# Patient Record
Sex: MALE | Race: WHITE | NOT HISPANIC OR LATINO | Employment: FULL TIME | URBAN - METROPOLITAN AREA
[De-identification: names, ages, dates, MRNs, and addresses within clinical notes are randomized per-mention and may not be internally consistent; named-entity substitution may affect disease eponyms.]

---

## 2024-01-24 ENCOUNTER — HOSPITAL ENCOUNTER (EMERGENCY)
Facility: HOSPITAL | Age: 64
Discharge: HOME | End: 2024-01-24
Attending: EMERGENCY MEDICINE
Payer: COMMERCIAL

## 2024-01-24 ENCOUNTER — APPOINTMENT (OUTPATIENT)
Dept: RADIOLOGY | Facility: HOSPITAL | Age: 64
End: 2024-01-24
Payer: COMMERCIAL

## 2024-01-24 VITALS
DIASTOLIC BLOOD PRESSURE: 86 MMHG | TEMPERATURE: 96.8 F | WEIGHT: 185 LBS | HEIGHT: 70 IN | SYSTOLIC BLOOD PRESSURE: 123 MMHG | HEART RATE: 62 BPM | BODY MASS INDEX: 26.48 KG/M2 | RESPIRATION RATE: 18 BRPM | OXYGEN SATURATION: 94 %
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
## 2024-01-24 DIAGNOSIS — S61.412A LACERATION OF LEFT HAND, FOREIGN BODY PRESENCE UNSPECIFIED, INITIAL ENCOUNTER: Primary | ICD-10-CM

## 2024-01-24 DIAGNOSIS — S52.502A CLOSED FRACTURE OF DISTAL END OF LEFT RADIUS, UNSPECIFIED FRACTURE MORPHOLOGY, INITIAL ENCOUNTER: ICD-10-CM

## 2024-01-24 PROCEDURE — 99284 EMERGENCY DEPT VISIT MOD MDM: CPT | Performed by: EMERGENCY MEDICINE

## 2024-01-24 PROCEDURE — 73110 X-RAY EXAM OF WRIST: CPT | Mod: LEFT SIDE | Performed by: RADIOLOGY

## 2024-01-24 PROCEDURE — 99291 CRITICAL CARE FIRST HOUR: CPT | Performed by: EMERGENCY MEDICINE

## 2024-01-24 PROCEDURE — 73590 X-RAY EXAM OF LOWER LEG: CPT | Mod: RIGHT SIDE | Performed by: RADIOLOGY

## 2024-01-24 PROCEDURE — 99284 EMERGENCY DEPT VISIT MOD MDM: CPT | Mod: 25

## 2024-01-24 PROCEDURE — 73130 X-RAY EXAM OF HAND: CPT | Mod: LEFT SIDE | Performed by: RADIOLOGY

## 2024-01-24 PROCEDURE — 12002 RPR S/N/AX/GEN/TRNK2.6-7.5CM: CPT | Mod: XS

## 2024-01-24 PROCEDURE — 73590 X-RAY EXAM OF LOWER LEG: CPT | Mod: RT

## 2024-01-24 PROCEDURE — 29125 APPL SHORT ARM SPLINT STATIC: CPT

## 2024-01-24 PROCEDURE — 73110 X-RAY EXAM OF WRIST: CPT | Mod: LT

## 2024-01-24 PROCEDURE — G0390 TRAUMA RESPONS W/HOSP CRITI: HCPCS

## 2024-01-24 PROCEDURE — 73130 X-RAY EXAM OF HAND: CPT | Mod: LT

## 2024-01-24 PROCEDURE — 12002 RPR S/N/AX/GEN/TRNK2.6-7.5CM: CPT
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
## 2024-01-25 ENCOUNTER — OFFICE VISIT (OUTPATIENT)
Dept: ORTHOPEDIC SURGERY | Facility: CLINIC | Age: 64
End: 2024-01-25
Payer: COMMERCIAL

## 2024-01-25 DIAGNOSIS — S52.502A TRAUMATIC CLOSED FX OF DISTAL END OF RADIUS WITH MINIMAL DISPLACEMENT, LEFT, INITIAL ENCOUNTER: Primary | ICD-10-CM

## 2024-01-25 PROCEDURE — 99203 OFFICE O/P NEW LOW 30 MIN: CPT | Performed by: ORTHOPAEDIC SURGERY

## 2024-01-25 PROCEDURE — 1036F TOBACCO NON-USER: CPT | Performed by: ORTHOPAEDIC SURGERY

## 2024-01-25 RX ORDER — PROPRANOLOL HYDROCHLORIDE 120 MG/1
120 CAPSULE, EXTENDED RELEASE ORAL
COMMUNITY
Start: 2023-04-04
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
## 2024-01-25 NOTE — LETTER
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 He is also unable to drive .This will be until further notice.  .    If you have any questions or concerns, please don't hesitate to call.         Sincerely,        Michael A LoPresti, MD    CC: No Recipients

## 2024-01-31 ENCOUNTER — APPOINTMENT (OUTPATIENT)
Dept: ORTHOPEDIC SURGERY | Facility: CLINIC | Age: 64
End: 2024-01-31

## 2024-02-01 DIAGNOSIS — M25.532 WRIST PAIN, ACUTE, LEFT: ICD-10-CM

## 2024-02-02 PROCEDURE — L3984 UPPER EXT FX ORTHOSIS WRIST: HCPCS | Performed by: ORTHOPAEDIC SURGERY

## 2024-02-02 PROCEDURE — L3995 SOCK FRACTURE OR EQUAL EACH: HCPCS | Performed by: ORTHOPAEDIC SURGERY

## 2024-02-02 NOTE — ED PROCEDURE NOTE
Procedure  Critical Care    Performed by: Kendra Mcclellan DO  Authorized by: Kendra Mcclellan DO    Critical care provider statement:     Critical care time (minutes):  35    Critical care time was exclusive of:  Separately billable procedures and treating other patients    Critical care was necessary to treat or prevent imminent or life-threatening deterioration of the following conditions:  Trauma    Critical care was time spent personally by me on the following activities:  Development of treatment plan with patient or surrogate, discussions with consultants, discussions with primary provider, evaluation of patient's response to treatment, review of old charts, re-evaluation of patient's condition, pulse oximetry, ordering and review of radiographic studies, ordering and review of laboratory studies, ordering and performing treatments and interventions, blood draw for specimens, examination of patient and obtaining history from patient or surrogate    Care discussed with comment:  Trauma surgeon               Kendra Mcclellan DO  02/02/24 0645

## 2024-02-05 ENCOUNTER — HOSPITAL ENCOUNTER (OUTPATIENT)
Dept: RADIOLOGY | Facility: CLINIC | Age: 64
Discharge: HOME | End: 2024-02-05
Payer: COMMERCIAL

## 2024-02-05 ENCOUNTER — OFFICE VISIT (OUTPATIENT)
Dept: ORTHOPEDIC SURGERY | Facility: CLINIC | Age: 64
End: 2024-02-05
Payer: COMMERCIAL

## 2024-02-05 VITALS — BODY MASS INDEX: 26.48 KG/M2 | WEIGHT: 185 LBS | HEIGHT: 70 IN

## 2024-02-05 DIAGNOSIS — M25.532 WRIST PAIN, ACUTE, LEFT: ICD-10-CM

## 2024-02-05 DIAGNOSIS — S52.502A TRAUMATIC CLOSED FX OF DISTAL END OF RADIUS WITH MINIMAL DISPLACEMENT, LEFT, INITIAL ENCOUNTER: ICD-10-CM

## 2024-02-05 DIAGNOSIS — S63.512A SPRAIN OF CARPAL JOINT OF LEFT WRIST, INITIAL ENCOUNTER: Primary | ICD-10-CM

## 2024-02-05 PROCEDURE — 99214 OFFICE O/P EST MOD 30 MIN: CPT | Performed by: ORTHOPAEDIC SURGERY

## 2024-02-05 PROCEDURE — 73110 X-RAY EXAM OF WRIST: CPT | Mod: LT

## 2024-02-05 PROCEDURE — 73110 X-RAY EXAM OF WRIST: CPT | Mod: LEFT SIDE | Performed by: RADIOLOGY

## 2024-02-05 NOTE — PROGRESS NOTES
Subjective    Patient ID: Michael Padron is a 63 y.o. male.    Chief Complaint: Worker's Compensation (DX: S52.502A/DOI: 1/24/24//F/U LT WRIST/)       This is a 63-year-old male seen in follow-up with regard to his left wrist injury which occurred at work 10 to 12 days ago.  Subsequent x-rays reviewed previously demonstrated a left distal radius fracture with minimal displacement.  The patient was placed in an Exos brace which he has been wearing regularly over the past 10 days.  Complains of mild to moderate discomfort but feels the swelling has diminished.  Denies any significant previous history of left wrist injury.    X-rays performed and reviewed at length by myself demonstrate the left distal radius fracture remains in satisfactory position and alignment.  On the AP view of the wrist though there is potential evidence of widening of the scapholunate space consistent with a ligamentous disruption in addition to the fracture    This patient's past medical, social, and family history were reviewed as well as a review of systems including updates on the patient's information encounter sheet    Physical Examination  Constitutional: Patient's height and weight reviewed, appears well kempt  Psychiatric: Alert and oriented ×3, appropriate mood and behavior  Pulmonary: Breathing appears nonlabored, no apparent distress  Lymphatic: No appreciable lymphadenopathy to both the upper and lower extremities  Skin: No open lesions, rashes, ulcerations  Neurologic: Gross motor and sensory exam appear intact (except for abnormalities noted in the below muscle skeletal exam)    Musculoskeletal: The brace was removed today and the left wrist reveals mild swelling.  Tenderness both over the distal radius and at the level of the carpus.  No gross deformity.  Neurologic intact to the fingertips.    Assessment: Left distal radius fracture with potential scapholunate disassociation    Plan: A long discussion ensued with the  patient regarding the fracture as well as the potential ligamentous injury to his left wrist.  We discussed the natural history of scapholunate dissociation with regard to arthritis over many years.  The patient is taken this in consideration and would like to go forward with the MRI scan to evaluate for potential ligamentous disruption.  We then further discussed the options including potential surgery if that is so desired by the patient.          Current Outpatient Medications:     propranolol LA (Inderal LA) 120 mg 24 hr capsule, Take 1 capsule (120 mg) by mouth once daily., Disp: , Rfl:

## 2024-02-20 ENCOUNTER — HOSPITAL ENCOUNTER (OUTPATIENT)
Dept: RADIOLOGY | Facility: CLINIC | Age: 64
Discharge: HOME | End: 2024-02-20
Payer: COMMERCIAL

## 2024-02-20 DIAGNOSIS — S52.502A TRAUMATIC CLOSED FX OF DISTAL END OF RADIUS WITH MINIMAL DISPLACEMENT, LEFT, INITIAL ENCOUNTER: ICD-10-CM

## 2024-02-20 PROCEDURE — 73221 MRI JOINT UPR EXTREM W/O DYE: CPT | Mod: LEFT SIDE | Performed by: RADIOLOGY

## 2024-02-20 PROCEDURE — 73221 MRI JOINT UPR EXTREM W/O DYE: CPT | Mod: LT

## 2024-02-29 ENCOUNTER — OFFICE VISIT (OUTPATIENT)
Dept: ORTHOPEDIC SURGERY | Facility: CLINIC | Age: 64
End: 2024-02-29
Payer: COMMERCIAL

## 2024-02-29 DIAGNOSIS — S52.502A TRAUMATIC CLOSED FX OF DISTAL END OF RADIUS WITH MINIMAL DISPLACEMENT, LEFT, INITIAL ENCOUNTER: Primary | ICD-10-CM

## 2024-02-29 DIAGNOSIS — S63.512A SPRAIN OF CARPAL JOINT OF LEFT WRIST, INITIAL ENCOUNTER: ICD-10-CM

## 2024-02-29 PROCEDURE — 99213 OFFICE O/P EST LOW 20 MIN: CPT | Performed by: ORTHOPAEDIC SURGERY

## 2024-02-29 NOTE — PROGRESS NOTES
Subjective    Patient ID: Michael Padron is a 63 y.o. male.    Chief Complaint: OTHER ([facility] CLAIM # [id number]/DOI 1/24/24/DX: S52.502A/S61.412A/F/U LT WRIST)       This is a 63-year-old male who is now 5 weeks status post closed management of a left distal radius fracture.  At his 2-week follow-up x-rays reviewed raise concerns for a scapholunate ligament injury in addition to the distal radius fracture.  Patient was subsequently sent for MRI scan of his left wrist which was somewhat delayed by Workmen's Comp. approval.  No findings on the MRI scan were compatible with a sprain of the scapholunate ligament more so off of the scaphoid attachment.  Patient has continued to wear the Exos brace.  Has noted decrease swelling.    This patient's past medical, social, and family history were reviewed as well as a review of systems including updates on the patient's information encounter sheet    Physical Examination  Constitutional: Patient's height and weight reviewed, appears well kempt  Psychiatric: Alert and oriented ×3, appropriate mood and behavior  Pulmonary: Breathing appears nonlabored, no apparent distress  Lymphatic: No appreciable lymphadenopathy to both the upper and lower extremities  Skin: No open lesions, rashes, ulcerations  Neurologic: Gross motor and sensory exam appear intact (except for abnormalities noted in the below muscle skeletal exam)    Musculoskeletal: The brace is removed today and the swelling has dramatically decreased.  There is no crepitus or motion at the fracture site.  There is minimal tenderness over the distal radius.  There is some mild tenderness over the dorsal and volar aspect of the carpus region.  Neurologic intact at the fingertips.    Assessment: Healing left distal radius fracture with scapholunate ligament disruption    Plan: A long discussion ensued with the patient regarding the scapholunate ligament disruption and the natural history of of the development of wrist  arthritis over time with this injury.  The patient was made aware of both surgical and nonsurgical treatment options but we have suggested further evaluation by our upper extremity specialist Dr. Prescott for his input with regard to surgical versus nonsurgical options.  The patient is aware that if nonsurgical options are elected I would be glad to continue with follow-up and management of his injury.  At the present time he is not able to return to regular work duty          Current Outpatient Medications:     propranolol LA (Inderal LA) 120 mg 24 hr capsule, Take 1 capsule (120 mg) by mouth once daily., Disp: , Rfl:

## 2024-03-06 ENCOUNTER — OFFICE VISIT (OUTPATIENT)
Dept: ORTHOPEDIC SURGERY | Facility: CLINIC | Age: 64
End: 2024-03-06
Payer: COMMERCIAL

## 2024-03-06 DIAGNOSIS — S63.392A TRAUMATIC RUPTURE OF LEFT SCAPHOLUNATE LIGAMENT: ICD-10-CM

## 2024-03-06 DIAGNOSIS — S52.602A CLOSED FRACTURE OF LEFT DISTAL RADIUS AND ULNA, INITIAL ENCOUNTER: Primary | ICD-10-CM

## 2024-03-06 DIAGNOSIS — S61.412A: ICD-10-CM

## 2024-03-06 DIAGNOSIS — S52.502A CLOSED FRACTURE OF LEFT DISTAL RADIUS AND ULNA, INITIAL ENCOUNTER: Primary | ICD-10-CM

## 2024-03-06 PROCEDURE — 99213 OFFICE O/P EST LOW 20 MIN: CPT | Performed by: ORTHOPAEDIC SURGERY
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
## 2024-04-17 ENCOUNTER — OFFICE VISIT (OUTPATIENT)
Dept: ORTHOPEDIC SURGERY | Facility: CLINIC | Age: 64
End: 2024-04-17
Payer: COMMERCIAL

## 2024-04-17 VITALS — WEIGHT: 185 LBS | HEIGHT: 70 IN | BODY MASS INDEX: 26.48 KG/M2

## 2024-04-17 DIAGNOSIS — S63.392A TRAUMATIC RUPTURE OF LEFT SCAPHOLUNATE LIGAMENT: ICD-10-CM

## 2024-04-17 DIAGNOSIS — S52.602A CLOSED FRACTURE OF LEFT DISTAL RADIUS AND ULNA, INITIAL ENCOUNTER: Primary | ICD-10-CM

## 2024-04-17 DIAGNOSIS — S52.502A CLOSED FRACTURE OF LEFT DISTAL RADIUS AND ULNA, INITIAL ENCOUNTER: Primary | ICD-10-CM

## 2024-04-17 PROCEDURE — 99213 OFFICE O/P EST LOW 20 MIN: CPT | Performed by: ORTHOPAEDIC SURGERY

## 2024-04-17 RX ORDER — SODIUM CHLORIDE, SODIUM LACTATE, POTASSIUM CHLORIDE, CALCIUM CHLORIDE 600; 310; 30; 20 MG/100ML; MG/100ML; MG/100ML; MG/100ML
100 INJECTION, SOLUTION INTRAVENOUS CONTINUOUS
Status: CANCELLED | OUTPATIENT
Start: 2024-04-30

## 2024-04-17 RX ORDER — CEFAZOLIN SODIUM 2 G/100ML
2 INJECTION, SOLUTION INTRAVENOUS ONCE
Status: CANCELLED | OUTPATIENT
Start: 2024-04-30 | End: 2024-04-17

## 2024-04-17 NOTE — PROGRESS NOTES
Subjective    Patient ID: Michael Padron is a 63 y.o. male.    Chief Complaint: Follow-up and Worker's Compensation of the Left Wrist ([facility] CLAIM # [id number]/DOI 1/24/24/DX: S52.502A/S61.412A)     Last Surgery: No surgery found  Last Surgery Date: No surgery found    HPI  Patient comes in for follow-up of his left wrist injury.  He has had approval obtained for surgery on his left wrist for scapholunate ligament rupture.  He has been using an Exos fracture brace to try to minimize his pain.    Objective   Ortho Exam  Patient is in no acute distress.  Exam of his left wrist reveals the skin envelope is intact.  He is tender dorsally over the scapholunate interval.  He has a positive Will shift test.  He is tender within the anatomic snuffbox as well.    Image Results:  MR wrist left wo IV contrast  Narrative: Interpreted By:  Alexander Blankenship and Herzog Jackson   STUDY:  MRI of the  left wrist without IV contrast;  2/20/2024 6:30 pm      INDICATION:  Signs/Symptoms:EVAL FOR SCAPHOLUNATE DISSOCIATION.      COMPARISON:  Left wrist radiographs 02/05/2024      ACCESSION NUMBER(S):  [id number]      ORDERING CLINICIAN:  MICHAEL LOPRESTI      TECHNIQUE:  MR imaging of the  left wrist was obtained  without administration of  intravenous contrast medium.      FINDINGS:  TENDONS:  The extensor tendons are intact. The flexor tendons are intact. There  is no tenosynovitis.      LIGAMENTS:  There is thickening with increased T2 and T1 signal intensity in the  scapholunate ligament particularly involving the midsubstance and the  scaphoid attachment. There appears to be some reactive marrow change  in the proximal pole of the scaphoid at the scapholunate ligament  attachment. There is widening of the scapholunate articulation. These  findings can be seen such as at image 9 in the coronal plane. The  lunotriquetral ligament is intact. The triangular fibrocartilage  complex is intact.      JOINTS:  There is no dislocation or  subluxation. There is no joint effusion.  There is no significant arthrosis.      OSSEOUS STRUCTURES:  There is a nondisplaced fracture of the distal radius with extension  to the carpometacarpal joint with surrounding edema. No joint  dislocation or effusion. No pronounced degenerative changes.      SOFT TISSUES:  There is no muscle tear or atrophy.      The median nerve in the carpal tunnel is unremarkable. The ulnar  nerve in Guyon's canal is unremarkable.      Impression: 1. Findings compatible with a degree of compromise of the  scapholunate ligament with likely at least mild-to-moderate sprain of  the ligament proper greatest at the scaphoid attachment with  associated reactive marrow change. This is otherwise not well  assessed due to motion. CT of the wrist may be helpful to assess for  subtle avulsion fracturing at the scaphoid attachment of the  ligament. MR arthrogram of the wrist could also be considered to  further assess the integrity of the ligament.  2. Nondisplaced fracture of the distal radius with extension to the  carpal/metacarpal joint surrounding marrow edema.      I personally reviewed the images/study and I agree with the findings  as stated. This study was interpreted at [facility], [place], [place].      MACRO:  None      Signed by: Alexander Blankenship 2/21/2024 10:23 AM  Dictation workstation:   [id number]      Assessment/Plan   Encounter Diagnoses:  Closed fracture of left distal radius and ulna, initial encounter    Traumatic rupture of left scapholunate ligament    Orders Placed This Encounter    Request for Pre-Admission Testing Visit    Basic Metabolic Panel    ECG 12 lead    Case Request Operating Room: Capsulodesis Wrist     Patient has a left wrist scapholunate ligament injury.  He has now been approved for surgery on this side.  I discussed with him in detail the risk, benefits alternatives of a left wrist scapholunate ligament reconstruction.  The  patient voiced understanding and informed consent was obtained.  The patient will call to schedule surgery.

## 2024-04-18 RX ORDER — CHLORHEXIDINE GLUCONATE ORAL RINSE 1.2 MG/ML
15 SOLUTION DENTAL AS NEEDED
Qty: 120 ML | Refills: 0 | Status: SHIPPED | OUTPATIENT
Start: 2024-04-18

## 2024-04-18 RX ORDER — CHLORHEXIDINE GLUCONATE 40 MG/ML
SOLUTION TOPICAL DAILY
Qty: 118 ML | Refills: 0 | Status: SHIPPED | OUTPATIENT
Start: 2024-04-18 | End: 2024-04-23

## 2024-04-24 ENCOUNTER — PRE-ADMISSION TESTING (OUTPATIENT)
Dept: PREADMISSION TESTING | Facility: HOSPITAL | Age: 64
End: 2024-04-24
Payer: COMMERCIAL

## 2024-04-24 VITALS
RESPIRATION RATE: 20 BRPM | SYSTOLIC BLOOD PRESSURE: 110 MMHG | HEIGHT: 70 IN | DIASTOLIC BLOOD PRESSURE: 71 MMHG | BODY MASS INDEX: 26.48 KG/M2 | WEIGHT: 184.97 LBS | HEART RATE: 71 BPM | OXYGEN SATURATION: 96 % | TEMPERATURE: 96.8 F

## 2024-04-24 DIAGNOSIS — Z01.818 PRE-OP TESTING: Primary | ICD-10-CM

## 2024-04-24 DIAGNOSIS — S63.392A TRAUMATIC RUPTURE OF LEFT SCAPHOLUNATE LIGAMENT: ICD-10-CM

## 2024-04-24 LAB
ANION GAP SERPL CALC-SCNC: 13 MMOL/L (ref 10–20)
BUN SERPL-MCNC: 8 MG/DL (ref 6–23)
CALCIUM SERPL-MCNC: 9.8 MG/DL (ref 8.6–10.3)
CHLORIDE SERPL-SCNC: 104 MMOL/L (ref 98–107)
CO2 SERPL-SCNC: 27 MMOL/L (ref 21–32)
CREAT SERPL-MCNC: 1.08 MG/DL (ref 0.5–1.3)
EGFRCR SERPLBLD CKD-EPI 2021: 77 ML/MIN/1.73M*2
EST. AVERAGE GLUCOSE BLD GHB EST-MCNC: 148 MG/DL
GLUCOSE SERPL-MCNC: 174 MG/DL (ref 74–99)
HBA1C MFR BLD: 6.8 %
HCT VFR BLD AUTO: 47.1 % (ref 41–52)
HGB BLD-MCNC: 15.3 G/DL (ref 13.5–17.5)
POTASSIUM SERPL-SCNC: 4.8 MMOL/L (ref 3.5–5.3)
SODIUM SERPL-SCNC: 139 MMOL/L (ref 136–145)

## 2024-04-24 PROCEDURE — 87081 CULTURE SCREEN ONLY: CPT | Mod: PARLAB | Performed by: NURSE PRACTITIONER

## 2024-04-24 PROCEDURE — 36415 COLL VENOUS BLD VENIPUNCTURE: CPT

## 2024-04-24 PROCEDURE — 85014 HEMATOCRIT: CPT

## 2024-04-24 PROCEDURE — 80048 BASIC METABOLIC PNL TOTAL CA: CPT

## 2024-04-24 PROCEDURE — 93010 ELECTROCARDIOGRAM REPORT: CPT | Performed by: STUDENT IN AN ORGANIZED HEALTH CARE EDUCATION/TRAINING PROGRAM

## 2024-04-24 PROCEDURE — 99203 OFFICE O/P NEW LOW 30 MIN: CPT | Performed by: NURSE PRACTITIONER

## 2024-04-24 PROCEDURE — 83036 HEMOGLOBIN GLYCOSYLATED A1C: CPT

## 2024-04-24 PROCEDURE — 93005 ELECTROCARDIOGRAM TRACING: CPT

## 2024-04-24 RX ORDER — CHLORHEXIDINE GLUCONATE 40 MG/ML
SOLUTION TOPICAL DAILY
Qty: 118 ML | Refills: 0 | Status: SHIPPED | OUTPATIENT
Start: 2024-04-24 | End: 2024-04-30 | Stop reason: HOSPADM

## 2024-04-24 RX ORDER — ALBUTEROL SULFATE 90 UG/1
2 AEROSOL, METERED RESPIRATORY (INHALATION) EVERY 6 HOURS PRN
COMMUNITY

## 2024-04-24 ASSESSMENT — PAIN - FUNCTIONAL ASSESSMENT: PAIN_FUNCTIONAL_ASSESSMENT: 0-10

## 2024-04-24 ASSESSMENT — PAIN SCALES - GENERAL: PAINLEVEL_OUTOF10: 3

## 2024-04-24 NOTE — PREPROCEDURE INSTRUCTIONS
Medication List            Accurate as of April 24, 2024  9:39 AM. Always use your most recent med list.                albuterol 90 mcg/actuation inhaler  Medication Adjustments for Surgery: Other (Comment)  Notes to patient: Continue as prescribed     * chlorhexidine 0.12 % solution  Commonly known as: Peridex  Use 15 mL in the mouth or throat if needed for wound care (oral rinse the night before surgery and the morning on the day of surgery) for up to 2 doses. Swish in mouth and spit out     * chlorhexidine 4 % external liquid  Commonly known as: Hibiclens  Apply topically once daily for 5 days. Wash daily for 5 days prior to surgery with day 5 being morning of surgery.     propranolol  mg 24 hr capsule  Commonly known as: Inderal LA  Medication Adjustments for Surgery: Take morning of surgery with sip of water, no other fluids           * This list has 2 medication(s) that are the same as other medications prescribed for you. Read the directions carefully, and ask your doctor or other care provider to review them with you.                                  NPO Instructions:    Do not eat any food after midnight the night before your surgery/procedure.    Additional Instructions:     Day of Surgery:  Review your medication instructions, take indicated medications  Wear  comfortable loose fitting clothing  Do not use moisturizers, creams, lotions or perfume  All jewelry and valuables should be left at home                  PRE-OPERATIVE INSTRUCTIONS FOR SURGERY    *Do not eat anything after midnight the night of surgery.  This includes food of any kind (including hard candy, cough drops, mints).   You may have up to TEN ounces of clear liquid  until TWO hours prior to your arrival time to the hospital.  This includes water, black tea/coffee, (no milk or cream) apple juice and electrolyte drinks (GATORADE).  You may chew gum until TWO hours prior you your surgery/procedure.         *One of our staff members  will call you ONE business day before your surgery, between 11 am-2 pm to let you know the time to arrive.  If you have no received a call by 2 pm, call [phone]  *When you arrive at the hospital-->GO TO Registration on the ground floor  *Stop smoking 24 hours prior to surgery.  No Marijuana, CBD Oil or Vaping for 48 hours  *No alcohol 24 hours prior to surgery  *You will need a responsible adult to drive you home  -No acrylic nails or nail polish on at least one fingernail, NO polish on toes for foot surgery  -You may be asked to remove your dentures, partial plate, eyeglasses or contact lenses before going to surgery.  Please bring a case for these items.  -Body piercings need to be removed.  Jewelry and valuables should be left at home.  -Put on loose,  comfortable, clean clothing, that will accommodate bandages        What is a home antibacterial shower?  This shower is a way of cleaning the skin with a germ killing solution before surgery.  The solution contains chlorhexidine, commonly known as CHG.  CHG is a skin cleanser with germ killing ability.  Let your doctor know if you are allergic to chlorhexidine.    Why do I need to take a preoperative antibacterial shower?  Skin is not sterile.  It is best to try to make your skin as free of germs as possible before surgery.  Proper cleansing with a germ killing soap before surgery can lower the number of germs on your skin.  This helps to reduce the risk of infection at the surgical site.  Following the instructions listed below will help you prepare your skin for surgery.      How do I use the solution?    Steps: Begin using your CHG soap 5 days before your surgery on __________________.    *First, wash and rinse your hair using the CHG soap.  Keep CHG soap away from ear canals and eyes.   Rinse completely, do not condition.  Hair extensions should be removed.    *Wash your face with your normal soap and rinse.   *Apply the CHG solution to a clean wet  washcloth.  Turn the water off or move away from the water spray to avoid premature rinsing of the CHG soap as you are applying.  Firmly lather your entire body from the neck down.  Do not use on your face.    *Pay special attention to the area(s) where your incision(s) will be located unless they are on your face.  Avoid scrubbing your skin too hard.  The important part is to have the CHG soap sit on your skin for 3 minutes.   *When the 3 minutes are up, turn on the water and rinse the CHG solution off your body completely.  *Do not wash with regular soap after you  have  used the CHG soap solution.  *Pat  yourself dry with a clean, freshly laundered towel.  *Do not apply powders, deodorants or lotions.  *Dress in clean freshly laundered night clothes.    *Be sure to sleep with clean freshly laundered sheets.    *Be aware the CHG will cause stains on fabrics; if you wash them with bleach after use.  Rinse your washcloth and other linens that have contact with CHG completely.  Use only non-chlorine detergents to launder the items  used.  *The morning of surgery is the fifth day.  Repeat the above steps and dress in clean comfortable clothing.     What is oral/dental rinse?  It is mouthwash.  It is a way of cleaning the he mouth with a germ-killing solution before your surgery.  The solution contains chlorhexidine, commonly known as CHG.  It is used inside the mouth to kill a bacteria known as Staphylococcus aureus.  Let your doctor know if you are allergic to Chlorhexidine.    Why do I need to use CHG oral/ dental rinse?  The CHG oral/dental rinse helps to kill bacteria in your mouth know as Staphylococcus aureus.  This reduces the risk of infection at the surgical site.    Using your CHG oral/dental rinse    STEPS:    Use your CHG oral/dental rinse after you brush your teeth the night before (at bedtime) and the morning of your surgery.  Follow all the directions on your prescription label.  *Use the cap on the  container to measure 15 ml  *Swish (gargle if you can) the mouthwash in your mouth for at least 30 seconds, (do not swallow) and spit out  *After you use your CHG rinse, do not rinse your mouth with water, drink or eat.  Please refer to the prescription label for the appropriate time to resume oral intake.    What side effects might I have using the CHG oral/dental rinse?  CHG rinse will stick you plaque on the teeth.  Brush and floss just before use.   Teeth brushing will help avoid staining of the plaque during  use.  Teeth brushing will help avoid staining of plaque during  use.    Who should I contact if I have questions about the CHG oral/dental rinse and or CHG soap?  Please call [facility], Pre-Admission testing at [phone] if you have any questions.    What you may be asked to bring to surgery:  ___Crutches, walker  ___CPAP machine  ___Urine specimen
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
## 2024-04-26 LAB — STAPHYLOCOCCUS SPEC CULT: NORMAL

## 2024-04-27 LAB
ATRIAL RATE: 65 BPM
P AXIS: 48 DEGREES
P OFFSET: 191 MS
P ONSET: 133 MS
PR INTERVAL: 156 MS
Q ONSET: 211 MS
QRS COUNT: 11 BEATS
QRS DURATION: 98 MS
QT INTERVAL: 378 MS
QTC CALCULATION(BAZETT): 393 MS
QTC FREDERICIA: 388 MS
R AXIS: -46 DEGREES
T AXIS: 37 DEGREES
T OFFSET: 400 MS
VENTRICULAR RATE: 65 BPM

## 2024-04-30 ENCOUNTER — HOSPITAL ENCOUNTER (OUTPATIENT)
Facility: HOSPITAL | Age: 64
Setting detail: OUTPATIENT SURGERY
Discharge: HOME | End: 2024-04-30
Attending: ORTHOPAEDIC SURGERY | Admitting: ORTHOPAEDIC SURGERY
Payer: COMMERCIAL

## 2024-04-30 ENCOUNTER — APPOINTMENT (OUTPATIENT)
Dept: RADIOLOGY | Facility: HOSPITAL | Age: 64
End: 2024-04-30
Payer: COMMERCIAL

## 2024-04-30 ENCOUNTER — ANESTHESIA (OUTPATIENT)
Dept: OPERATING ROOM | Facility: HOSPITAL | Age: 64
End: 2024-04-30
Payer: COMMERCIAL

## 2024-04-30 ENCOUNTER — ANESTHESIA EVENT (OUTPATIENT)
Dept: OPERATING ROOM | Facility: HOSPITAL | Age: 64
End: 2024-04-30
Payer: COMMERCIAL

## 2024-04-30 VITALS
BODY MASS INDEX: 27.55 KG/M2 | HEIGHT: 70 IN | WEIGHT: 192.46 LBS | TEMPERATURE: 97 F | DIASTOLIC BLOOD PRESSURE: 69 MMHG | RESPIRATION RATE: 16 BRPM | OXYGEN SATURATION: 97 % | HEART RATE: 53 BPM | SYSTOLIC BLOOD PRESSURE: 142 MMHG

## 2024-04-30 DIAGNOSIS — Z01.818 PRE-OP TESTING: ICD-10-CM

## 2024-04-30 DIAGNOSIS — S63.392A TRAUMATIC RUPTURE OF LEFT SCAPHOLUNATE LIGAMENT: ICD-10-CM

## 2024-04-30 DIAGNOSIS — S52.602A CLOSED FRACTURE OF LEFT DISTAL RADIUS AND ULNA, INITIAL ENCOUNTER: Primary | ICD-10-CM

## 2024-04-30 DIAGNOSIS — S52.502A CLOSED FRACTURE OF LEFT DISTAL RADIUS AND ULNA, INITIAL ENCOUNTER: Primary | ICD-10-CM

## 2024-04-30 PROBLEM — J45.909 ASTHMA (HHS-HCC): Status: ACTIVE | Noted: 2024-04-30

## 2024-04-30 PROBLEM — G25.0 ESSENTIAL TREMOR: Status: ACTIVE | Noted: 2024-04-30

## 2024-04-30 PROCEDURE — 7100000010 HC PHASE TWO TIME - EACH INCREMENTAL 1 MINUTE: Performed by: ORTHOPAEDIC SURGERY

## 2024-04-30 PROCEDURE — 76000 FLUOROSCOPY <1 HR PHYS/QHP: CPT | Mod: 59

## 2024-04-30 PROCEDURE — 2500000004 HC RX 250 GENERAL PHARMACY W/ HCPCS (ALT 636 FOR OP/ED): Performed by: NURSE ANESTHETIST, CERTIFIED REGISTERED

## 2024-04-30 PROCEDURE — A25320 PR REVISE WRIST JOINT,CARPAL INSTABIL: Performed by: NURSE ANESTHETIST, CERTIFIED REGISTERED

## 2024-04-30 PROCEDURE — 7100000001 HC RECOVERY ROOM TIME - INITIAL BASE CHARGE: Performed by: ORTHOPAEDIC SURGERY

## 2024-04-30 PROCEDURE — 2720000007 HC OR 272 NO HCPCS: Performed by: ORTHOPAEDIC SURGERY

## 2024-04-30 PROCEDURE — 3700000001 HC GENERAL ANESTHESIA TIME - INITIAL BASE CHARGE: Performed by: ORTHOPAEDIC SURGERY

## 2024-04-30 PROCEDURE — 7100000002 HC RECOVERY ROOM TIME - EACH INCREMENTAL 1 MINUTE: Performed by: ORTHOPAEDIC SURGERY

## 2024-04-30 PROCEDURE — 25320 REPAIR/REVISE WRIST JOINT: CPT | Performed by: ORTHOPAEDIC SURGERY

## 2024-04-30 PROCEDURE — C1713 ANCHOR/SCREW BN/BN,TIS/BN: HCPCS | Performed by: ORTHOPAEDIC SURGERY

## 2024-04-30 PROCEDURE — 2780000003 HC OR 278 NO HCPCS: Performed by: ORTHOPAEDIC SURGERY

## 2024-04-30 PROCEDURE — 7100000009 HC PHASE TWO TIME - INITIAL BASE CHARGE: Performed by: ORTHOPAEDIC SURGERY

## 2024-04-30 PROCEDURE — 2500000004 HC RX 250 GENERAL PHARMACY W/ HCPCS (ALT 636 FOR OP/ED): Performed by: ANESTHESIOLOGY

## 2024-04-30 PROCEDURE — 2500000005 HC RX 250 GENERAL PHARMACY W/O HCPCS: Performed by: NURSE ANESTHETIST, CERTIFIED REGISTERED

## 2024-04-30 PROCEDURE — 2500000004 HC RX 250 GENERAL PHARMACY W/ HCPCS (ALT 636 FOR OP/ED): Performed by: ORTHOPAEDIC SURGERY

## 2024-04-30 PROCEDURE — A25320 PR REVISE WRIST JOINT,CARPAL INSTABIL: Performed by: ANESTHESIOLOGY

## 2024-04-30 PROCEDURE — 2500000005 HC RX 250 GENERAL PHARMACY W/O HCPCS: Performed by: ORTHOPAEDIC SURGERY

## 2024-04-30 PROCEDURE — 3700000002 HC GENERAL ANESTHESIA TIME - EACH INCREMENTAL 1 MINUTE: Performed by: ORTHOPAEDIC SURGERY

## 2024-04-30 PROCEDURE — 3600000008 HC OR TIME - EACH INCREMENTAL 1 MINUTE - PROCEDURE LEVEL THREE: Performed by: ORTHOPAEDIC SURGERY

## 2024-04-30 PROCEDURE — 3600000003 HC OR TIME - INITIAL BASE CHARGE - PROCEDURE LEVEL THREE: Performed by: ORTHOPAEDIC SURGERY

## 2024-04-30 DEVICE — H/W INTERNALBRACE LGMNT AUGMNT REPR KIT
Type: IMPLANTABLE DEVICE | Site: WRIST | Status: FUNCTIONAL
Brand: ARTHREX®

## 2024-04-30 DEVICE — KWIRE, BUSA, .054 X 4IN, NO 2, STERILE: Type: IMPLANTABLE DEVICE | Site: WRIST | Status: FUNCTIONAL

## 2024-04-30 RX ORDER — MEPERIDINE HYDROCHLORIDE 25 MG/ML
12.5 INJECTION INTRAMUSCULAR; INTRAVENOUS; SUBCUTANEOUS EVERY 10 MIN PRN
Status: DISCONTINUED | OUTPATIENT
Start: 2024-04-30 | End: 2024-04-30 | Stop reason: HOSPADM

## 2024-04-30 RX ORDER — LIDOCAINE HYDROCHLORIDE 10 MG/ML
0.1 INJECTION INFILTRATION; PERINEURAL ONCE
Status: DISCONTINUED | OUTPATIENT
Start: 2024-04-30 | End: 2024-04-30 | Stop reason: HOSPADM

## 2024-04-30 RX ORDER — PROPOFOL 10 MG/ML
INJECTION, EMULSION INTRAVENOUS AS NEEDED
Status: DISCONTINUED | OUTPATIENT
Start: 2024-04-30 | End: 2024-04-30

## 2024-04-30 RX ORDER — FENTANYL CITRATE 50 UG/ML
INJECTION, SOLUTION INTRAMUSCULAR; INTRAVENOUS AS NEEDED
Status: DISCONTINUED | OUTPATIENT
Start: 2024-04-30 | End: 2024-04-30

## 2024-04-30 RX ORDER — LIDOCAINE HYDROCHLORIDE 20 MG/ML
INJECTION, SOLUTION EPIDURAL; INFILTRATION; INTRACAUDAL; PERINEURAL AS NEEDED
Status: DISCONTINUED | OUTPATIENT
Start: 2024-04-30 | End: 2024-04-30

## 2024-04-30 RX ORDER — BUPIVACAINE HYDROCHLORIDE 5 MG/ML
INJECTION, SOLUTION PERINEURAL AS NEEDED
Status: DISCONTINUED | OUTPATIENT
Start: 2024-04-30 | End: 2024-04-30 | Stop reason: HOSPADM

## 2024-04-30 RX ORDER — TRAMADOL HYDROCHLORIDE 50 MG/1
50 TABLET ORAL EVERY 6 HOURS PRN
Qty: 28 TABLET | Refills: 0 | Status: SHIPPED | OUTPATIENT
Start: 2024-04-30 | End: 2024-05-13 | Stop reason: SDUPTHER

## 2024-04-30 RX ORDER — ONDANSETRON HYDROCHLORIDE 2 MG/ML
INJECTION, SOLUTION INTRAVENOUS AS NEEDED
Status: DISCONTINUED | OUTPATIENT
Start: 2024-04-30 | End: 2024-04-30

## 2024-04-30 RX ORDER — CEFAZOLIN SODIUM 2 G/100ML
2 INJECTION, SOLUTION INTRAVENOUS ONCE
Status: COMPLETED | OUTPATIENT
Start: 2024-04-30 | End: 2024-04-30

## 2024-04-30 RX ORDER — SODIUM CHLORIDE, SODIUM LACTATE, POTASSIUM CHLORIDE, CALCIUM CHLORIDE 600; 310; 30; 20 MG/100ML; MG/100ML; MG/100ML; MG/100ML
100 INJECTION, SOLUTION INTRAVENOUS CONTINUOUS
Status: DISCONTINUED | OUTPATIENT
Start: 2024-04-30 | End: 2024-04-30 | Stop reason: HOSPADM

## 2024-04-30 RX ORDER — HYDROMORPHONE HYDROCHLORIDE 1 MG/ML
1 INJECTION, SOLUTION INTRAMUSCULAR; INTRAVENOUS; SUBCUTANEOUS EVERY 5 MIN PRN
Status: DISCONTINUED | OUTPATIENT
Start: 2024-04-30 | End: 2024-04-30 | Stop reason: HOSPADM

## 2024-04-30 RX ORDER — MIDAZOLAM HYDROCHLORIDE 1 MG/ML
INJECTION, SOLUTION INTRAMUSCULAR; INTRAVENOUS AS NEEDED
Status: DISCONTINUED | OUTPATIENT
Start: 2024-04-30 | End: 2024-04-30

## 2024-04-30 RX ADMIN — PROPOFOL 200 MG: 10 INJECTION, EMULSION INTRAVENOUS at 07:36

## 2024-04-30 RX ADMIN — CEFAZOLIN SODIUM 2 G: 2 INJECTION, SOLUTION INTRAVENOUS at 07:30

## 2024-04-30 RX ADMIN — PROPOFOL 50 MG: 10 INJECTION, EMULSION INTRAVENOUS at 07:53

## 2024-04-30 RX ADMIN — LIDOCAINE HYDROCHLORIDE 50 MG: 20 INJECTION, SOLUTION EPIDURAL; INFILTRATION; INTRACAUDAL; PERINEURAL at 07:36

## 2024-04-30 RX ADMIN — FENTANYL CITRATE 25 MCG: 50 INJECTION, SOLUTION INTRAMUSCULAR; INTRAVENOUS at 08:45

## 2024-04-30 RX ADMIN — FENTANYL CITRATE 25 MCG: 50 INJECTION, SOLUTION INTRAMUSCULAR; INTRAVENOUS at 08:23

## 2024-04-30 RX ADMIN — ONDANSETRON 4 MG: 2 INJECTION, SOLUTION INTRAMUSCULAR; INTRAVENOUS at 07:36

## 2024-04-30 RX ADMIN — PROPOFOL 50 MG: 10 INJECTION, EMULSION INTRAVENOUS at 08:01

## 2024-04-30 RX ADMIN — DEXAMETHASONE SODIUM PHOSPHATE 8 MG: 4 INJECTION, SOLUTION INTRAMUSCULAR; INTRAVENOUS at 07:36

## 2024-04-30 RX ADMIN — HYDROMORPHONE HYDROCHLORIDE 1 MG: 1 INJECTION, SOLUTION INTRAMUSCULAR; INTRAVENOUS; SUBCUTANEOUS at 09:34

## 2024-04-30 RX ADMIN — SODIUM CHLORIDE, SODIUM LACTATE, POTASSIUM CHLORIDE, AND CALCIUM CHLORIDE 100 ML/HR: 600; 310; 30; 20 INJECTION, SOLUTION INTRAVENOUS at 06:50

## 2024-04-30 RX ADMIN — FENTANYL CITRATE 50 MCG: 50 INJECTION, SOLUTION INTRAMUSCULAR; INTRAVENOUS at 08:59

## 2024-04-30 RX ADMIN — HYDROMORPHONE HYDROCHLORIDE 0.5 MG: 1 INJECTION, SOLUTION INTRAMUSCULAR; INTRAVENOUS; SUBCUTANEOUS at 09:50

## 2024-04-30 RX ADMIN — MIDAZOLAM 2 MG: 1 INJECTION INTRAMUSCULAR; INTRAVENOUS at 07:36

## 2024-04-30 RX ADMIN — SODIUM CHLORIDE, SODIUM LACTATE, POTASSIUM CHLORIDE, AND CALCIUM CHLORIDE: 600; 310; 30; 20 INJECTION, SOLUTION INTRAVENOUS at 07:20

## 2024-04-30 SDOH — HEALTH STABILITY: MENTAL HEALTH: CURRENT SMOKER: 1

## 2024-04-30 ASSESSMENT — PAIN - FUNCTIONAL ASSESSMENT
PAIN_FUNCTIONAL_ASSESSMENT: 0-10
PAIN_FUNCTIONAL_ASSESSMENT: VAS (VISUAL ANALOG SCALE)
PAIN_FUNCTIONAL_ASSESSMENT: 0-10

## 2024-04-30 ASSESSMENT — PAIN SCALES - GENERAL
PAINLEVEL_OUTOF10: 0 - NO PAIN
PAINLEVEL_OUTOF10: 0 - NO PAIN
PAINLEVEL_OUTOF10: 8
PAINLEVEL_OUTOF10: 3
PAINLEVEL_OUTOF10: 8
PAIN_LEVEL: 0
PAINLEVEL_OUTOF10: 5 - MODERATE PAIN

## 2024-04-30 ASSESSMENT — COLUMBIA-SUICIDE SEVERITY RATING SCALE - C-SSRS
1. IN THE PAST MONTH, HAVE YOU WISHED YOU WERE DEAD OR WISHED YOU COULD GO TO SLEEP AND NOT WAKE UP?: NO
6. HAVE YOU EVER DONE ANYTHING, STARTED TO DO ANYTHING, OR PREPARED TO DO ANYTHING TO END YOUR LIFE?: NO
2. HAVE YOU ACTUALLY HAD ANY THOUGHTS OF KILLING YOURSELF?: NO

## 2024-04-30 ASSESSMENT — PAIN DESCRIPTION - DESCRIPTORS: DESCRIPTORS: ACHING

## 2024-04-30 NOTE — OP NOTE
LEFT SCAPHOLUNATE LIGAMENT RECONSTRUCTION WITH C-ARM (L) Operative Note     Date: 2024  OR Location: [facility]    Name: Michael Padron, : 1960, Age: 63 y.o., MRN: [id number], Sex: male    Diagnosis  Pre-op Diagnosis     * Traumatic rupture of left scapholunate ligament [S63.392A] Post-op Diagnosis     * Traumatic rupture of left scapholunate ligament [S63.392A]     Procedures  LEFT SCAPHOLUNATE LIGAMENT RECONSTRUCTION WITH C-ARM  57364 - [place] CAPSL-RHPHY/RCNSTJ WRST OPN CARPL INS      Surgeons      * Levon Prescott - Primary    Resident/Fellow/Other Assistant:  Surgeons and Role:  * No surgeons found with a matching role *    Procedure Summary  Anesthesia: General  ASA: II  Anesthesia Staff: Anesthesiologist: Asad Amor MD  CRNA: CHRYSTAL Xavier-JANAY, LILLIAN  Estimated Blood Loss: 10 mL  Intra-op Medications:   Administrations occurring from 0730 to 0900 on 24:   Medication Name Total Dose   BUPivacaine HCl (Marcaine) 0.5 % (5 mg/mL) injection 10 mL   lactated Ringer's infusion Cannot be calculated   ceFAZolin in dextrose (iso-os) (Ancef) IVPB 2 g 2 g              Anesthesia Record               Intraprocedure I/O Totals          Intake    lactated Ringer's infusion 500.00 mL    ceFAZolin in dextrose (iso-os) (Ancef) IVPB 2 g 100.00 mL    Total Intake 600 mL          Specimen: No specimens collected     Staff:   Circulator: Gorge Vale RN  Scrub Person: Jerilyn Manjarrez RN         Drains and/or Catheters: * None in log *    Tourniquet Times:     Total Tourniquet Time Documented:  Arm - Upper (Left) - 37 minutes  Total: Arm - Upper (Left) - 37 minutes      Implants:  Implants       Type Name Action Serial No.      Implant REPAIR KIT, LIGAMENT AUGMENTATION, HAND/WRIST INTERNABRACE - [id number] Implanted      Guidewire CARINE LONDONO, .054 X 4IN, NO 2, STERILE - [id number] Implanted               Findings: Left scapholunate ligament disruption    Indications: Michael Padron is an 63 y.o.  male who is having surgery for Traumatic rupture of left scapholunate ligament [S63.837A].  Patient had sustained an injury to his left wrist at work.  He had sustained a left distal wrist fracture which was treated conservatively.  However because of continued pain and widening of the scapholunate interval found on an x-ray and MRI was performed which did show an acute disruption of the scapholunate ligament.  I discussed with the patient conservative versus surgical treatment options for this.  He elected to undergo surgery.  I discussed with him in detail the risk, benefits alternatives of a left scapholunate ligament reconstruction.  I explained to him that the risks of the procedure include but are not limited to infection, iatrogenic fracture, posttraumatic arthritis, continued pain and weakness, as well as risks associate with anesthesia.  The patient voiced understanding and informed consent was obtained.    The patient was seen in the preoperative area. The risks, benefits, complications, treatment options, non-operative alternatives, expected recovery and outcomes were discussed with the patient. The possibilities of reaction to medication, pulmonary aspiration, injury to surrounding structures, bleeding, recurrent infection, the need for additional procedures, failure to diagnose a condition, and creating a complication requiring transfusion or operation were discussed with the patient. The patient concurred with the proposed plan, giving informed consent.  The site of surgery was properly noted/marked if necessary per policy. The patient has been actively warmed in preoperative area. Preoperative antibiotics have been ordered and given within 1 hours of incision. Venous thrombosis prophylaxis have been ordered including bilateral sequential compression devices    Procedure Details: The patient was prepped identified in the preoperative waiting area and his left wrist was marked as site of surgery.  Ancef  was administered intravenously.  Patient was taken back to the operating room suite placed supine on the OR table.  A nonsterile tourniquet was applied to the patient's left upper extremity.  After general anesthesia with LMA was administered patient's left upper extremity was prepped and draped in you sterile fashion.  A preoperative verification timeout was taken.  Patient's left upper extremity was exsanguinated with an Esmarch bandage and a tourniquet inflated to 250mmHg.  At this point approximately a 6 inch longitudinal incision was made over the dorsal aspect of the left wrist.  Sharp dissection was carried through skin and subcutaneous tissue.  Electrocautery was used to achieve hemostasis.  Identified the fourth dorsal compartment and opened up the retinaculum.  I retracted the extensor tendons ulnarly.  Identified the sensory branch of the posterior interosseous nerve.  I transected this with electrocautery.  I then sharply opened up the dorsal wrist capsule.  Identified the scaphoid and the lunate.  The disruption of the SL ligament was easy to identify.  I placed K wires as joysticks into the lunate and scaphoid.  I then reduced the tube bones to close down the gap.  I held this in place with a needle  on the K wires.  I used C-arm fluoroscopy in orthogonal views to confirm alignment of the scaphoid and lunate.  I was satisfied with this.  Percutaneously I then placed a scaphoid capitate K wire.  I used C-arm fluoroscopy in orthogonal views to confirm placement of this.  I then secured closure of the scapholunate interval by placing Arthrex swivel lock suture anchors initially in the proximal pole the scaphoid tacking down fiber tape then into the lunate again tacking down fiber tape and a third swivel lock suture anchor in the distal pole of the scaphoid.  I used C-arm fluoroscopy in orthogonal views to confirm alignment of the scaphoid and lunate.  I satisfied with this.  K wires were removed  from the scaphoid and lunate.  The K wire that was placed from the scaphoid into the capitate was cut short with a Jurgan ball placed on top of it.  Wound was copiously irrigated with saline.  Wrist capsule was closed with 4-0 FiberWire.  The fourth dorsal compartment retinaculum was closed with 3-0 Vicryl.  Tourniquet was let down after 37 minutes.  Good vascular return was seen to the patient's left hand and all digits.  Subcu tissue was closed with 3-0 Vicryl.  Skin was closed with a running 4-0 Vicryl suture.  10 mL of half percent plain Marcaine was injected for local anesthetic.  Sterile bandage consisting of Xeroform, gauze 4 x 4's, and Webril were placed on the patient's left wrist.  Patient was placed in a volar wrist splint and a thumb spica splint.  Patient was awakened from anesthesia and returned to recovery room in stable condition.  There were no complications in the case.  All sponge and needle counts were correct at the end of the case.  Complications:  None; patient tolerated the procedure well.    Disposition: PACU - hemodynamically stable.  Condition: stable         Additional Details: None    Attending Attestation: I performed the procedure.    Levon Prescott  Phone Number: [phone]

## 2024-04-30 NOTE — ANESTHESIA PROCEDURE NOTES
Airway  Date/Time: 4/30/2024 7:37 AM  Urgency: elective      Staffing  Performed: CRNA   Authorized by: Asad Amor MD    Performed by: CHRYSTAL Xavier-JANAY, LILLIAN  Patient location during procedure: OR    Indications and Patient Condition  Indications for airway management: anesthesia  Spontaneous ventilation: present  Sedation level: deep  Preoxygenated: yes  Patient position: sniffing  MILS maintained throughout  Mask difficulty assessment: 0 - not attempted    Final Airway Details  Final airway type: supraglottic airway      Successful airway: Supreme  Size 5     Number of attempts at approach: 1  Ventilation between attempts: none  Number of other approaches attempted: 0

## 2024-04-30 NOTE — ANESTHESIA POSTPROCEDURE EVALUATION
Patient: Michael Padron    Procedure Summary       Date: 04/30/24 Room / Location: [facility]  / [facility]    Anesthesia Start: 0720 Anesthesia Stop: 0908    Procedure: LEFT SCAPHOLUNATE LIGAMENT RECONSTRUCTION WITH C-ARM (Left: Wrist) Diagnosis:       Traumatic rupture of left scapholunate ligament      (Traumatic rupture of left scapholunate ligament [S63.392A])    Surgeons: Levon Prescott MD Responsible Provider: Asad Amor MD    Anesthesia Type: MAC ASA Status: 2            Anesthesia Type: MAC    Vitals Value Taken Time   /66 04/30/24 0945   Temp 36.1 °C (97 °F) 04/30/24 0901   Pulse 54 04/30/24 0958   Resp 16 04/30/24 0945   SpO2 98 % 04/30/24 0958       Anesthesia Post Evaluation    Patient location during evaluation: PACU  Patient participation: complete - patient participated  Level of consciousness: awake and alert  Pain score: 0  Pain management: adequate  Airway patency: patent  Cardiovascular status: acceptable  Respiratory status: acceptable  Hydration status: acceptable  Postoperative Nausea and Vomiting: none        There were no known notable events for this encounter.

## 2024-04-30 NOTE — BRIEF OP NOTE
Date: 2024  OR Location: [facility]    Name: Michael Padron, : 1960, Age: 63 y.o., MRN: [id number], Sex: male    Diagnosis  Pre-op Diagnosis     * Traumatic rupture of left scapholunate ligament [S63.392A] Post-op Diagnosis     * Traumatic rupture of left scapholunate ligament [S63.392A]     Procedures  LEFT SCAPHOLUNATE LIGAMENT RECONSTRUCTION WITH C-ARM  15487 - [place] CAPSL-RHPHY/RCNSTJ WRST OPN CARPL INS      Surgeons      * Levon Prescott - Primary    Resident/Fellow/Other Assistant:  Surgeons and Role:  * No surgeons found with a matching role *    Procedure Summary  Anesthesia: General  ASA: II  Anesthesia Staff: Anesthesiologist: Asad Amor MD  CRNA: CHRYSTAL Xavier-LILLIAN GUSTAFSON  Estimated Blood Loss: 10 mL  Intra-op Medications:   Administrations occurring from 0730 to 0900 on 24:   Medication Name Total Dose   BUPivacaine HCl (Marcaine) 0.5 % (5 mg/mL) injection 10 mL   ceFAZolin in dextrose (iso-os) (Ancef) IVPB 2 g 2 g              Anesthesia Record               Intraprocedure I/O Totals          Intake    ceFAZolin in dextrose (iso-os) (Ancef) IVPB 2 g 100.00 mL    Total Intake 100 mL          Specimen: No specimens collected     Staff:   Circulator: Gorge Vale RN  Scrub Person: Jerilyn Manjarrez RN          Findings: Rupture of scapholunate ligament    Complications:  None; patient tolerated the procedure well.     Disposition: PACU - hemodynamically stable.  Condition: stable  Specimens Collected: No specimens collected  Attending Attestation: I performed the procedure.    Levon Prescott  Phone Number: [phone]

## 2024-04-30 NOTE — ANESTHESIA PREPROCEDURE EVALUATION
Patient: Michael Padron    Procedure Information       Date/Time: 04/30/24 0730    Procedure: LEFT SCAPHOLUNATE LIGAMENT RECONSTRUCTION WITH C-ARM (Left: Wrist)    Location: [facility] 07 / [facility]    Surgeons: Levon Prescott MD            Relevant Problems   Anesthesia (within normal limits)      Pulmonary   (+) Asthma (HHS-HCC)       Clinical information reviewed:   Tobacco  Allergies  Meds   Med Hx  Surg Hx   Fam Hx  Soc Hx        NPO Detail:  NPO/Void Status  Date of Last Liquid: 04/30/24  Time of Last Liquid: 0400  Date of Last Solid: 04/29/24  Time of Last Solid: 1800         Physical Exam    Airway  Mallampati: I  TM distance: >3 FB  Neck ROM: full     Cardiovascular - normal exam     Dental - normal exam     Pulmonary - normal exam     Abdominal - normal exam             Anesthesia Plan    History of general anesthesia?: yes  History of complications of general anesthesia?: no    ASA 2     general     The patient is a current smoker.  Patient was previously instructed to abstain from smoking on day of procedure.  Patient did not smoke on day of procedure.    intravenous induction   Postoperative administration of opioids is intended.  Trial extubation is planned.  Anesthetic plan and risks discussed with patient.  Use of blood products discussed with patient who.    Plan discussed with CRNA.

## 2024-05-01 ASSESSMENT — PAIN SCALES - GENERAL: PAINLEVEL_OUTOF10: 0 - NO PAIN

## 2024-05-13 ENCOUNTER — HOSPITAL ENCOUNTER (OUTPATIENT)
Dept: RADIOLOGY | Facility: CLINIC | Age: 64
Discharge: HOME | End: 2024-05-13
Payer: COMMERCIAL

## 2024-05-13 ENCOUNTER — OFFICE VISIT (OUTPATIENT)
Dept: ORTHOPEDIC SURGERY | Facility: CLINIC | Age: 64
End: 2024-05-13
Payer: COMMERCIAL

## 2024-05-13 DIAGNOSIS — S52.602A CLOSED FRACTURE OF LEFT DISTAL RADIUS AND ULNA, INITIAL ENCOUNTER: ICD-10-CM

## 2024-05-13 DIAGNOSIS — S63.392A TRAUMATIC RUPTURE OF LEFT SCAPHOLUNATE LIGAMENT: Primary | ICD-10-CM

## 2024-05-13 DIAGNOSIS — S52.502A CLOSED FRACTURE OF LEFT DISTAL RADIUS AND ULNA, INITIAL ENCOUNTER: ICD-10-CM

## 2024-05-13 DIAGNOSIS — S63.392A TRAUMATIC RUPTURE OF LEFT SCAPHOLUNATE LIGAMENT: ICD-10-CM

## 2024-05-13 PROCEDURE — 73110 X-RAY EXAM OF WRIST: CPT | Mod: LEFT SIDE | Performed by: RADIOLOGY

## 2024-05-13 PROCEDURE — 99024 POSTOP FOLLOW-UP VISIT: CPT | Performed by: ORTHOPAEDIC SURGERY

## 2024-05-13 PROCEDURE — 73110 X-RAY EXAM OF WRIST: CPT | Mod: LT

## 2024-05-13 RX ORDER — SULFAMETHOXAZOLE AND TRIMETHOPRIM 800; 160 MG/1; MG/1
1 TABLET ORAL 2 TIMES DAILY
Qty: 20 TABLET | Refills: 0 | Status: SHIPPED | OUTPATIENT
Start: 2024-05-13 | End: 2024-05-23

## 2024-05-13 RX ORDER — TRAMADOL HYDROCHLORIDE 50 MG/1
50 TABLET ORAL EVERY 6 HOURS PRN
Qty: 28 TABLET | Refills: 0 | Status: SHIPPED | OUTPATIENT
Start: 2024-05-13 | End: 2024-05-23 | Stop reason: SDUPTHER

## 2024-05-13 NOTE — PROGRESS NOTES
Subjective    Patient ID: Michael Padron is a 63 y.o. male.    Chief Complaint: Post-op and Worker's Compensation of the Left Wrist ([facility] CLAIM # [id number]/DOI 1/24/24/DX: S52.502A/S61.412A///LEFT SCAPHOLUNATE LIGAMENT RECONSTRUCTION/DOS:4/30/24)     Last Surgery: Left Scapholunate Ligament Reconstruction With C-arm - Left  Last Surgery Date: 4/30/2024    HPI  Patient comes in for his postoperative visit after undergoing a left scapholunate ligament reconstruction.  His pain has been well-managed with tramadol and ibuprofen.  He denies any numbness and paresthesias.    Objective   Ortho Exam  Patient is in no acute distress.  Exam of his left wrist reveals his incision is well-healed.  There is no evidence of infection.  The pin site has mild amount of serous drainage.  There is no warmth erythema.  He has full range of motion in his fingers.    Image Results:  X-rays of his left wrist were personally reviewed today.  They show adequate lamina at the scapholunate interval.  He has a K wire through the scaphoid capitate joint.    Assessment/Plan   Encounter Diagnoses:  Traumatic rupture of left scapholunate ligament    Orders Placed This Encounter    XR wrist left 3+ views     Patient is doing satisfactory following his left scapholunate ligament reconstruction.  He will be prescribed Bactrim to help prevent a potential pin tract infection.  He was also placed into a short arm thumb spica cast.  He will follow-up in 4 weeks with x-rays of his left wrist out of the cast.

## 2024-05-23 ENCOUNTER — TELEPHONE (OUTPATIENT)
Dept: ORTHOPEDIC SURGERY | Facility: CLINIC | Age: 64
End: 2024-05-23
Payer: COMMERCIAL

## 2024-05-23 DIAGNOSIS — S52.502A CLOSED FRACTURE OF LEFT DISTAL RADIUS AND ULNA, INITIAL ENCOUNTER: ICD-10-CM

## 2024-05-23 DIAGNOSIS — S52.602A CLOSED FRACTURE OF LEFT DISTAL RADIUS AND ULNA, INITIAL ENCOUNTER: ICD-10-CM

## 2024-05-23 RX ORDER — TRAMADOL HYDROCHLORIDE 50 MG/1
50 TABLET ORAL EVERY 6 HOURS PRN
Qty: 28 TABLET | Refills: 0 | Status: SHIPPED | OUTPATIENT
Start: 2024-05-23 | End: 2024-05-30

## 2024-05-23 NOTE — TELEPHONE ENCOUNTER
Patient calling in to request refill of Tramadol has 4 left. OARRS checked and scanned in,      [id number] - [address] AT [address] [address]  Phone: [phone] Fax: [phone]

## 2024-06-12 ENCOUNTER — APPOINTMENT (OUTPATIENT)
Dept: ORTHOPEDIC SURGERY | Facility: CLINIC | Age: 64
End: 2024-06-12
Payer: COMMERCIAL

## 2024-06-12 ENCOUNTER — HOSPITAL ENCOUNTER (OUTPATIENT)
Dept: RADIOLOGY | Facility: CLINIC | Age: 64
Discharge: HOME | End: 2024-06-12
Payer: COMMERCIAL

## 2024-06-12 DIAGNOSIS — S63.392A TRAUMATIC RUPTURE OF LEFT SCAPHOLUNATE LIGAMENT: ICD-10-CM

## 2024-06-12 DIAGNOSIS — S63.392A TRAUMATIC RUPTURE OF LEFT SCAPHOLUNATE LIGAMENT: Primary | ICD-10-CM

## 2024-06-12 PROCEDURE — 99024 POSTOP FOLLOW-UP VISIT: CPT | Performed by: ORTHOPAEDIC SURGERY

## 2024-06-12 PROCEDURE — 73110 X-RAY EXAM OF WRIST: CPT | Mod: LT

## 2024-06-12 RX ORDER — SULFAMETHOXAZOLE AND TRIMETHOPRIM 800; 160 MG/1; MG/1
1 TABLET ORAL 2 TIMES DAILY
Qty: 20 TABLET | Refills: 0 | Status: SHIPPED | OUTPATIENT
Start: 2024-06-12 | End: 2024-06-22

## 2024-06-12 NOTE — PROGRESS NOTES
Subjective    Patient ID: Michael Padron is a 64 y.o. male.    Chief Complaint: OTHER (F/U Worker's Compensation of the Left Wrist ([facility] CLAIM # [id number]/DOI 1/24/24/DX: S52.502A/S61.412A///LEFT SCAPHOLUNATE LIGAMENT RECONSTRUCTION/DOS:4/30/24))     Last Surgery: Left Scapholunate Ligament Reconstruction With C-arm - Left  Last Surgery Date: 4/30/2024    HPI  Patient comes in for follow-up of his left scapholunate ligament reconstruction.  He is about 6 weeks out from surgery.  He states he feels as if the pin felt loose.  He at his previous appointment he was prescribed Bactrim because there was serous drainage.    Objective   Ortho Exam  Patient has a well-healed incision at his left wrist.  However at the pin site there was purulent drainage.  There was no surrounding erythema.  He had very little pain with the motion at his left wrist.  He otherwise had appropriate function of his EPL and FPL.    The K wire was removed without difficulty.    Image Results:  X-rays of his left wrist were personally reviewed today.  They show adequate alignment at the scapholunate ligament reconstruction site.  The K wire was still intact.  There is no fracture or dislocation.    Assessment/Plan   Encounter Diagnoses:  Traumatic rupture of left scapholunate ligament    Orders Placed This Encounter    Thumb Spica, Thumbster    XR wrist left 3+ views    Referral to Occupational Therapy    sulfamethoxazole-trimethoprim (Bactrim DS) 800-160 mg tablet     The patient has a stable left wrist following scapholunate ligament reconstruction.  He will be prescribed more Bactrim because of the continued pin tract infection.  He was placed into a thumb spica brace today.  He will begin occupational therapy.  He will follow-up in 5 weeks with x-rays of his left wrist, including a scaphoid view.

## 2024-06-18 ENCOUNTER — EVALUATION (OUTPATIENT)
Dept: OCCUPATIONAL THERAPY | Facility: CLINIC | Age: 64
End: 2024-06-18
Payer: COMMERCIAL

## 2024-06-18 DIAGNOSIS — S52.592A GREENSTICK FRACTURE OF DISTAL RADIUS, LEFT, CLOSED, INITIAL ENCOUNTER: ICD-10-CM

## 2024-06-18 DIAGNOSIS — S63.512A SPRAIN OF LEFT SCAPHOLUNATE LIGAMENT: ICD-10-CM

## 2024-06-18 DIAGNOSIS — S63.392A TRAUMATIC RUPTURE OF LEFT SCAPHOLUNATE LIGAMENT: ICD-10-CM

## 2024-06-18 DIAGNOSIS — S61.412A LACERATION OF LEFT HAND: Primary | ICD-10-CM

## 2024-06-18 PROCEDURE — 97165 OT EVAL LOW COMPLEX 30 MIN: CPT | Mod: GO

## 2024-06-18 PROCEDURE — 97110 THERAPEUTIC EXERCISES: CPT | Mod: GO

## 2024-06-18 ASSESSMENT — ENCOUNTER SYMPTOMS
LOSS OF SENSATION IN FEET: 0
OCCASIONAL FEELINGS OF UNSTEADINESS: 0
DEPRESSION: 0

## 2024-06-18 ASSESSMENT — PATIENT HEALTH QUESTIONNAIRE - PHQ9
2. FEELING DOWN, DEPRESSED OR HOPELESS: NOT AT ALL
SUM OF ALL RESPONSES TO PHQ9 QUESTIONS 1 AND 2: 0
1. LITTLE INTEREST OR PLEASURE IN DOING THINGS: NOT AT ALL

## 2024-06-18 ASSESSMENT — PAIN SCALES - GENERAL: PAINLEVEL_OUTOF10: 3

## 2024-06-18 ASSESSMENT — PAIN - FUNCTIONAL ASSESSMENT: PAIN_FUNCTIONAL_ASSESSMENT: 0-10
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
## 2024-06-24 ENCOUNTER — DOCUMENTATION (OUTPATIENT)
Dept: OCCUPATIONAL THERAPY | Facility: CLINIC | Age: 64
End: 2024-06-24
Payer: COMMERCIAL

## 2024-06-24 ENCOUNTER — APPOINTMENT (OUTPATIENT)
Dept: OCCUPATIONAL THERAPY | Facility: CLINIC | Age: 64
End: 2024-06-24
Payer: COMMERCIAL

## 2024-06-24 NOTE — PROGRESS NOTES
Occupational Therapy                 Therapy Communication Note    Patient Name: Michael Padron  MRN: [id number]  Today's Date: 6/24/2024     Discipline: Occupational Therapy    Comment: Spoke to patient on the phone today, regarding extending Medco-14 for 08/01/24. Patient stated his orthopedic asked OT to provide documentation regarding rationale for extending Medco-14. Patient would benefit from an extended Medco-14, as he has not completed any outpatient OT visits and would benefit from 1 month of continuous OT sessions before returning to work. Therapist emailed all information to orthopedic.

## 2024-06-24 NOTE — PROGRESS NOTES
OCCUPATIONAL THERAPY TREATMENT NOTE    Patient Name:  Michael Padron   Patient MRN: [id number]  Date: 6/26/2024  Time Calculation  Start Time: 1045  Stop Time: 1130  Time Calculation (min): 45 min    Insurance:  Visit number: 2 of 16  Insurance Type: Payor: [employer] / Plan: [employer] / Product Type: *No Product type* /   Authorization or Plan of Care date Range:  6/12-8/12/24   Copay: n/a  Referred by: Levon Prescott MD   Approved diagnosis: S61.412A, S52.592, S63.512A        OT Therapeutic Procedures Time Entry  Manual Therapy Time Entry: 15  Therapeutic Exercise Time Entry: 15  OT Modalities Time Entry  Whirlpool Time Entry: 15                  General:  Reason for visit: Traumatic rupture of L scapholunate ligament   Referred by: Levon Prescott MD     Type of surgery: Left Scapholunate Ligament Reconstruction With C-arm - Left  Date of surgery: 4/30/2024  Days since surgery: 57    Assessment:  Progress towards functional goals: Improved mobility in L wrist and Improved performance in daily activities   Response to interventions:  Increased L wrist mobility noted after manual therapy and stretches. Mild pain noted during stretches but tolerated all manual. Performed scar tissue massage to decrease sensitivity and scar tissue build up. Patient performed wrist/forearm AROM within functional range and tolerated light strengthening with wrist/forearm isometrics with assistance from therapist. Will progress light strengthening as tolerated.  and Patient tolerated therapeutic exercises well and without any adverse events.  Justification for continued skilled care: To address remaining functional, objective and subjective deficits to allow them to return to full independence with ADLs. Modify and progress home exercise program. Skilled intervention required to improve ROM which will improve function. Reduce pain to improve  "function.    Plan:  Progress exercises as tolerated to improve overall UE strength and performance in daily activities , Exercises to gradually increase range of motion., Manual therapy to  muscle tightness and improve joint mobility. , and Modalities as needed to address symptoms.    Therapy diagnoses:   1. Laceration of left hand  Follow Up In Occupational Therapy      2. Greenstick fracture of distal radius, left, closed, initial encounter  Follow Up In Occupational Therapy      3. Sprain of left scapholunate ligament  Follow Up In Occupational Therapy           Subjective:  Michael reports he feels like his condition is improving.  Progress towards functional goal:   Patient reports his L wrist is is moving better. Continues to have moderate pain with movement. Has been completing HEP exercises.  and Improved mobility in L wrist  Pain Location L wrist  Pain (0-10): 6   HEP adherence / understanding: compliance with the instructed home exercises.    Precautions:  Fall Risk: None    Precautions listed: scapholunate lig reconstruction protocol      Objective: NP    Treatment Performed: (\"NP\" = Not Performed)     Therapeutic Exercise: 15 mins  - Wrist AROM flex/ext over bolster  - Wrist AROM rd/ul dev over bolster   - Forearm AROM sup/pro   - Forearm sup/pro and wrist in all planes isometrics with 5 second holds; assisted by therapist    Therapeutic Activities: NP  -   Manual Therapy: 15 mins  - Joint mobilization and wrist stretching in all planes   - Scar massage to surgical scar     Neuromuscular Re-Education: NP  -   Modalities: 15 mins  - Fluidotherapy with wrist and digit mobility    Education: Reviewed home exercise program.  "

## 2024-06-26 ENCOUNTER — TREATMENT (OUTPATIENT)
Dept: OCCUPATIONAL THERAPY | Facility: CLINIC | Age: 64
End: 2024-06-26
Payer: COMMERCIAL

## 2024-06-26 DIAGNOSIS — S52.592A GREENSTICK FRACTURE OF DISTAL RADIUS, LEFT, CLOSED, INITIAL ENCOUNTER: ICD-10-CM

## 2024-06-26 DIAGNOSIS — S61.412A LACERATION OF LEFT HAND: ICD-10-CM

## 2024-06-26 DIAGNOSIS — S63.512A SPRAIN OF LEFT SCAPHOLUNATE LIGAMENT: ICD-10-CM

## 2024-06-26 PROCEDURE — 97140 MANUAL THERAPY 1/> REGIONS: CPT | Mod: GO

## 2024-06-26 PROCEDURE — 97110 THERAPEUTIC EXERCISES: CPT | Mod: GO

## 2024-06-26 PROCEDURE — 97022 WHIRLPOOL THERAPY: CPT | Mod: GO

## 2024-06-26 ASSESSMENT — PAIN - FUNCTIONAL ASSESSMENT: PAIN_FUNCTIONAL_ASSESSMENT: 0-10

## 2024-06-26 ASSESSMENT — PAIN SCALES - GENERAL: PAINLEVEL_OUTOF10: 6

## 2024-06-26 NOTE — PROGRESS NOTES
OCCUPATIONAL THERAPY TREATMENT NOTE    Patient Name:  Michael Padron   Patient MRN: [id number]  Date: 6/28/2024  Time Calculation  Start Time: 1030  Stop Time: 1115  Time Calculation (min): 45 min    Insurance:  Visit number: 3 of 16  Insurance Type: Payor: [employer] / Plan: [place] [employer]GANIZATION / Product Type: *No Product type* /   Authorization or Plan of Care date Range:  6/12-8/12/24   Copay: n/a  Referred by: Levon Prescott MD   Approved diagnosis: S61.412A, S52.592, S63.512A        OT Therapeutic Procedures Time Entry  Manual Therapy Time Entry: 15  Therapeutic Activity Time Entry: 15  OT Modalities Time Entry  Whirlpool Time Entry: 15                  General:  Reason for visit: Traumatic rupture of L scapholunate ligament   Referred by: Levon Prescott MD     Type of surgery: Left Scapholunate Ligament Reconstruction With C-arm - Left  Date of surgery: 4/30/2024  Days since surgery: 59    Assessment:  Progress towards functional goals: Improved mobility in L wrist and Improved performance in daily activities   Response to interventions:  Improved wrist mobility noted after manual therapy and stretches. Wrist flexion remains limited. Introduced fine motor activities to improve digit coordination and sustained pinch. Activities were difficult to complete d/t active tremors. Patient required increased completion time d/t the tremors. Provided patient with green and blue foam to progress foam  to strengthen L hand. Therapist instructed patient to not upgrade foams until patient is pain-free gripping the lighter foam.  , Patient tolerated therapeutic exercises well and without any adverse events., and Demonstrated increased fine motor control during therapeutic activities.   Justification for continued skilled care: To address remaining functional, objective and subjective deficits to allow them to return to full independence with  "ADLs. Modify and progress home exercise program. Skilled intervention required to improve ROM which will improve function. Reduce pain to improve function.    Plan:  Progress exercises as tolerated to improve overall UE strength and performance in daily activities , Exercises to gradually increase range of motion., Manual therapy to  muscle tightness and improve joint mobility. , and Modalities as needed to address symptoms.    Therapy diagnoses:   1. Laceration of left hand  Follow Up In Occupational Therapy      2. Greenstick fracture of distal radius, left, closed, initial encounter  Follow Up In Occupational Therapy      3. Sprain of left scapholunate ligament  Follow Up In Occupational Therapy             Subjective:  Michael reports he feels like his condition is improving.  Progress towards functional goal:   Patient reports his wrist is sore from his exercises. He is able to dress himself with BUE, an improvement from last session. Medco-14 was extended to 24., Improved mobility in L wrist, and Reduce pain level  Pain Location L wrist  Pain (0-10): 3   HEP adherence / understanding: compliance with the instructed home exercises.    Precautions:  Fall Risk: None    Precautions listed: scapholunate lig reconstruction protocol      Objective: NP    Treatment Performed: (\"NP\" = Not Performed)     Therapeutic Exercise: NP    Therapeutic Activities: 15 mins  - Purdue Pegboard        - Pinch, place, and remove pegs 1 at a time     Manual Therapy: 15 mins  - Joint mobilization and wrist stretching in all planes   - Scar massage to surgical scar     Neuromuscular Re-Education: NP  -   Modalities: 15 mins  - Fluidotherapy with wrist and digit mobility    Education: Reviewed home exercise program. Provided blue and green foam to upgrade foam  activities  "

## 2024-06-28 ENCOUNTER — TREATMENT (OUTPATIENT)
Dept: OCCUPATIONAL THERAPY | Facility: CLINIC | Age: 64
End: 2024-06-28
Payer: COMMERCIAL

## 2024-06-28 DIAGNOSIS — S63.512A SPRAIN OF LEFT SCAPHOLUNATE LIGAMENT: ICD-10-CM

## 2024-06-28 DIAGNOSIS — S61.412A LACERATION OF LEFT HAND: ICD-10-CM

## 2024-06-28 DIAGNOSIS — S52.592A GREENSTICK FRACTURE OF DISTAL RADIUS, LEFT, CLOSED, INITIAL ENCOUNTER: ICD-10-CM

## 2024-06-28 PROCEDURE — 97530 THERAPEUTIC ACTIVITIES: CPT | Mod: GO

## 2024-06-28 PROCEDURE — 97140 MANUAL THERAPY 1/> REGIONS: CPT | Mod: GO

## 2024-06-28 PROCEDURE — 97022 WHIRLPOOL THERAPY: CPT | Mod: GO

## 2024-06-28 ASSESSMENT — PAIN - FUNCTIONAL ASSESSMENT: PAIN_FUNCTIONAL_ASSESSMENT: 0-10

## 2024-06-28 ASSESSMENT — PAIN SCALES - GENERAL: PAINLEVEL_OUTOF10: 3

## 2024-07-01 NOTE — PROGRESS NOTES
OCCUPATIONAL THERAPY TREATMENT NOTE    Patient Name:  Michael Padron   Patient MRN: [id number]  Date: 7/3/2024  Time Calculation  Start Time: 1045  Stop Time: 1130  Time Calculation (min): 45 min    Insurance:  Visit number: 4 of 16  Insurance Type: Payor: [employer] / Plan: [employer] / Product Type: *No Product type* /   Authorization or Plan of Care date Range:  6/12-8/12/24   Copay: n/a  Referred by: Levon Prescott MD   Approved diagnosis: S61.412A, S52.592, S63.512A        OT Therapeutic Procedures Time Entry  Manual Therapy Time Entry: 15  Therapeutic Activity Time Entry: 15  OT Modalities Time Entry  Whirlpool Time Entry: 15                  General:  Reason for visit: Traumatic rupture of L scapholunate ligament   Referred by: Levon Prescott MD     Type of surgery: Left Scapholunate Ligament Reconstruction With C-arm - Left  Date of surgery: 4/30/2024  Days since surgery: 64    Assessment:  Progress towards functional goals: Improved mobility in L wrist and Improved performance in daily activities   Response to interventions:  Continued to have decreased wrist flexion after manual stretches. Demonstrated good sustained pinch during peg dominos. Notes mild soreness in thumb CMCJ during activities. Performed Purdue pegboard at an increased rate during session. Will introduced theraputty activities at NV. , Patient tolerated therapeutic exercises well and without any adverse events., and Demonstrated increased fine motor control during therapeutic activities.   Justification for continued skilled care: To address remaining functional, objective and subjective deficits to allow them to return to full independence with ADLs. Modify and progress home exercise program. Skilled intervention required to improve ROM which will improve function. Reduce pain to improve function.    Plan:  Progress exercises as tolerated to improve  "overall UE strength and performance in daily activities , Exercises to gradually increase range of motion., Manual therapy to  muscle tightness and improve joint mobility. , and Modalities as needed to address symptoms.  Perform theraputty activities NV    Therapy diagnoses:   1. Laceration of left hand  Follow Up In Occupational Therapy      2. Greenstick fracture of distal radius, left, closed, initial encounter  Follow Up In Occupational Therapy      3. Sprain of left scapholunate ligament  Follow Up In Occupational Therapy        Subjective:  Michael reports he feels like his condition is improving.  Progress towards functional goal:   Patient reports he continues to have tightness and mild pain in the L wrist. He is becoming frustrated with the slow healing process., Improved mobility in L wrist, and Reduce pain level  Pain Location L wrist  Pain (0-10): 3   HEP adherence / understanding: compliance with the instructed home exercises.    Precautions:  Fall Risk: None    Precautions listed: scapholunate lig reconstruction protocol      Objective: NP    Treatment Performed: (\"NP\" = Not Performed)     Therapeutic Exercise: NP    Therapeutic Activities: 15 mins  - Pinch, place, and remove pegs during Peg dominos  - Purdue Pegboard        - Pinch, place, and remove pegs 1 at a time     Manual Therapy: 15 mins  - Joint mobilization and wrist stretching in all planes     Neuromuscular Re-Education: NP  -   Modalities: 15 mins  - Fluidotherapy with wrist and digit mobility    Education: Reviewed home exercise program.   "

## 2024-07-03 ENCOUNTER — TREATMENT (OUTPATIENT)
Dept: OCCUPATIONAL THERAPY | Facility: CLINIC | Age: 64
End: 2024-07-03
Payer: COMMERCIAL

## 2024-07-03 DIAGNOSIS — S61.412A LACERATION OF LEFT HAND: ICD-10-CM

## 2024-07-03 DIAGNOSIS — S63.512A SPRAIN OF LEFT SCAPHOLUNATE LIGAMENT: ICD-10-CM

## 2024-07-03 DIAGNOSIS — S52.592A GREENSTICK FRACTURE OF DISTAL RADIUS, LEFT, CLOSED, INITIAL ENCOUNTER: ICD-10-CM

## 2024-07-03 PROCEDURE — 97022 WHIRLPOOL THERAPY: CPT | Mod: GO

## 2024-07-03 PROCEDURE — 97140 MANUAL THERAPY 1/> REGIONS: CPT | Mod: GO

## 2024-07-03 PROCEDURE — 97530 THERAPEUTIC ACTIVITIES: CPT | Mod: GO

## 2024-07-03 ASSESSMENT — PAIN - FUNCTIONAL ASSESSMENT: PAIN_FUNCTIONAL_ASSESSMENT: 0-10

## 2024-07-03 ASSESSMENT — PAIN SCALES - GENERAL: PAINLEVEL_OUTOF10: 3

## 2024-07-03 NOTE — PROGRESS NOTES
OCCUPATIONAL THERAPY TREATMENT NOTE    Patient Name:  Michael Padron   Patient MRN: [id number]  Date: 7/5/2024  Time Calculation  Start Time: 1030  Stop Time: 1115  Time Calculation (min): 45 min    Insurance:  Visit number: 5 of 16  Insurance Type: Payor: [employer] / Plan: [employer] / Product Type: *No Product type* /   Authorization or Plan of Care date Range:  6/12-8/12/24   Copay: n/a  Referred by: Levon Prescott MD   Approved diagnosis: S61.412A, S52.592, S63.512A        OT Therapeutic Procedures Time Entry  Manual Therapy Time Entry: 15  Therapeutic Activity Time Entry: 15  OT Modalities Time Entry  Whirlpool Time Entry: 15                  General:  Reason for visit: Traumatic rupture of L scapholunate ligament   Referred by: Levon Prescott MD     Type of surgery: Left Scapholunate Ligament Reconstruction With C-arm - Left  Date of surgery: 4/30/2024  Days since surgery: 66    Assessment:  Progress towards functional goals: Improved mobility in L wrist and Improved performance in daily activities   Response to interventions:  Mild edema noted to dorsal wrist. Retrograde massage performed to decrease swelling. Increased wrist mobility noted after manual therapy. Introduced theraputty activities to begin lightly improving /pinch strength. Provided HEP handout and theraputty. Patient verbalized full understanding of new activities. , Patient tolerated therapeutic exercises well and without any adverse events., and Improved tolerance to strengthening progressions.  Justification for continued skilled care: To address remaining functional, objective and subjective deficits to allow them to return to full independence with ADLs. Modify and progress home exercise program. Skilled intervention required to improve ROM which will improve function. Reduce pain to improve function.    Plan:  Progress exercises as tolerated to  "improve overall UE strength and performance in daily activities , Exercises to gradually increase range of motion., Manual therapy to  muscle tightness and improve joint mobility. , and Modalities as needed to address symptoms.    Therapy diagnoses:   1. Traumatic rupture of left scapholunate ligament        2. Laceration of left hand  Follow Up In Occupational Therapy      3. Greenstick fracture of distal radius, left, closed, initial encounter  Follow Up In Occupational Therapy      4. Sprain of left scapholunate ligament  Follow Up In Occupational Therapy          Subjective:  Michael reports he feels like his condition is improving.  Progress towards functional goal:   Patient reports he slept without the brace the other night and notes no increased pain. Patient continues to use his L hand more often and notes increased soreness. Wears the brace after activity to decrease pain. , Improved mobility in L wrist, and Reduce pain level  Pain Location L wrist  Pain (0-10): 2   HEP adherence / understanding: compliance with the instructed home exercises.    Precautions:  Fall Risk: None    Precautions listed: scapholunate lig reconstruction protocol      Objective: NP    Treatment Performed: (\"NP\" = Not Performed)     Therapeutic Exercise: NP    Therapeutic Activities: 15 mins  - Theraputty activities; provided HEP handout and white/blue theraputty        - mass grasp        - alternating digit pinch       - alternating digit pinch and pull        - intrinsic plus         - lateral pinch        - 3 jaw pinch    Manual Therapy: 15 mins  - Joint mobilization and wrist stretching in all planes   - Retrograde massage to dorsal wrist    Neuromuscular Re-Education: NP  -   Modalities: 15 mins  - Fluidotherapy with wrist and digit mobility    Education: Added Theraputty  exercise(s) to HEP program Access Code [email or web address]  "

## 2024-07-05 ENCOUNTER — TREATMENT (OUTPATIENT)
Dept: OCCUPATIONAL THERAPY | Facility: CLINIC | Age: 64
End: 2024-07-05
Payer: COMMERCIAL

## 2024-07-05 DIAGNOSIS — S61.412A LACERATION OF LEFT HAND: ICD-10-CM

## 2024-07-05 DIAGNOSIS — S52.592A GREENSTICK FRACTURE OF DISTAL RADIUS, LEFT, CLOSED, INITIAL ENCOUNTER: ICD-10-CM

## 2024-07-05 DIAGNOSIS — S63.392A TRAUMATIC RUPTURE OF LEFT SCAPHOLUNATE LIGAMENT: Primary | ICD-10-CM

## 2024-07-05 DIAGNOSIS — S63.512A SPRAIN OF LEFT SCAPHOLUNATE LIGAMENT: ICD-10-CM

## 2024-07-05 PROCEDURE — 97022 WHIRLPOOL THERAPY: CPT | Mod: GO

## 2024-07-05 PROCEDURE — 97530 THERAPEUTIC ACTIVITIES: CPT | Mod: GO

## 2024-07-05 PROCEDURE — 97140 MANUAL THERAPY 1/> REGIONS: CPT | Mod: GO

## 2024-07-05 ASSESSMENT — PAIN SCALES - GENERAL: PAINLEVEL_OUTOF10: 2

## 2024-07-05 ASSESSMENT — PAIN - FUNCTIONAL ASSESSMENT: PAIN_FUNCTIONAL_ASSESSMENT: 0-10

## 2024-07-08 NOTE — PROGRESS NOTES
OCCUPATIONAL THERAPY TREATMENT NOTE    Patient Name:  Michael Padron   Patient MRN: [id number]  Date: 7/10/2024  Time Calculation  Start Time: 914  Stop Time: 952  Time Calculation (min): 38 min    Insurance:  Visit number: 6 of 16  Insurance Type: Payor: [employer] / Plan: [employer] / Product Type: *No Product type* /   Authorization or Plan of Care date Range:  -24   Copay: n/a  Referred by: Levon Prescott MD   Approved diagnosis: S61.412A, S52.592, S63.512A        OT Therapeutic Procedures Time Entry  Manual Therapy Time Entry: 10  Therapeutic Exercise Time Entry: 15  OT Modalities Time Entry  Whirlpool Time Entry: 13                  General:  Reason for visit: Traumatic rupture of L scapholunate ligament   Referred by: Levon Prescott MD     Type of surgery: Left Scapholunate Ligament Reconstruction With C-arm - Left  Date of surgery: 2024  Days since surgery: 71    Assessment:  Progress towards functional goals: Improved mobility in L wrist and Improved performance in daily activities   Response to interventions:  Tightness in dorsal wrist with gentle passive wrist flexion stretches.  OT updated HEP to include wrist and forearm strengthening.  Patient demonstrated good techniques and understanding of progression.  Good fit with wrist wrap to wean from large wrist cock up brace for heavier household activities.    Justification for continued skilled care: To address remaining functional, objective and subjective deficits to allow them to return to full independence with ADLs. Modify and progress home exercise program. Skilled intervention required to improve ROM which will improve function. Reduce pain to improve function.    Plan:  Progress exercises as tolerated to improve overall UE strength and performance in daily activities , Exercises to gradually increase range of motion., Manual therapy to   "muscle tightness and improve joint mobility. , and Modalities as needed to address symptoms.    Therapy diagnoses:   1. Laceration of left hand  Follow Up In Occupational Therapy      2. Greenstick fracture of distal radius, left, closed, initial encounter  Follow Up In Occupational Therapy      3. Sprain of left scapholunate ligament  Follow Up In Occupational Therapy            Subjective:  Michael reports he feels like his condition is improving.  He is now able to sleep without the brace, he is able to go most of the day without the brace.  Progress towards functional goal:  Improve  and pinch strength, Improved performance in daily activities , and Reduce pain level  Pain Location L wrist  Pain (0-10): 3   HEP adherence / understanding: compliance with the instructed home exercises.    Precautions:  Fall Risk: None    Precautions listed: scapholunate lig reconstruction protocol      Objective: NP    Treatment Performed: (\"NP\" = Not Performed)     Therapeutic Exercise: 10 min  HEP Update:  3 way wrist PREs 1#  Hammer pronation/supination  Wrist wrap for heavier household activities    Therapeutic Activities: NP    Manual Therapy: 15 mins  - Joint mobilization and wrist stretching in all planes   - Retrograde massage to dorsal wrist    Neuromuscular Re-Education: NP  -   Modalities: 13 mins  - Fluidotherapy with wrist and digit mobility    Education: Added 3 way wrist PREs and hammer turn exercise(s) to HEP program   "

## 2024-07-10 ENCOUNTER — TREATMENT (OUTPATIENT)
Dept: OCCUPATIONAL THERAPY | Facility: CLINIC | Age: 64
End: 2024-07-10
Payer: COMMERCIAL

## 2024-07-10 DIAGNOSIS — S61.412A LACERATION OF LEFT HAND: ICD-10-CM

## 2024-07-10 DIAGNOSIS — S52.592A GREENSTICK FRACTURE OF DISTAL RADIUS, LEFT, CLOSED, INITIAL ENCOUNTER: ICD-10-CM

## 2024-07-10 DIAGNOSIS — S63.512A SPRAIN OF LEFT SCAPHOLUNATE LIGAMENT: ICD-10-CM

## 2024-07-10 PROCEDURE — 97022 WHIRLPOOL THERAPY: CPT | Mod: GO | Performed by: OCCUPATIONAL THERAPIST

## 2024-07-10 PROCEDURE — 97110 THERAPEUTIC EXERCISES: CPT | Mod: GO | Performed by: OCCUPATIONAL THERAPIST

## 2024-07-10 PROCEDURE — 97140 MANUAL THERAPY 1/> REGIONS: CPT | Mod: GO | Performed by: OCCUPATIONAL THERAPIST

## 2024-07-11 NOTE — PROGRESS NOTES
OCCUPATIONAL THERAPY TREATMENT NOTE    Patient Name:  Michael Padron   Patient MRN: [id number]  Date: 2024  Time Calculation  Start Time: 08  Stop Time: 925  Time Calculation (min): 40 min    Insurance:  Visit number: 7 of 16  Insurance Type: Payor: [employer] / Plan: [employer]ATION / Product Type: *No Product type* /   Authorization or Plan of Care date Range:  -24   Copay: n/a  Referred by: Levon Prescott MD   Approved diagnosis: S61.412A, S52.592, S63.512A        OT Therapeutic Procedures Time Entry  Manual Therapy Time Entry: 15  Therapeutic Activity Time Entry: 10  OT Modalities Time Entry  Whirlpool Time Entry: 15                  General:  Reason for visit: Traumatic rupture of L scapholunate ligament   Referred by: Levon Prescott MD     Type of surgery: Left Scapholunate Ligament Reconstruction With C-arm - Left  Date of surgery: 2024  Days since surgery: 73    Assessment:  Progress towards functional goals: Improved mobility in L wrist and Improved performance in daily activities   Response to interventions:  OT initiated BTE program and patient completed with no adverse reactions.  Increased soreness throughout the thumb, wrist and forearm at completion of program   and Patient was appropriately fatigued with no complaints.  Justification for continued skilled care: To address remaining functional, objective and subjective deficits to allow them to return to full independence with ADLs. Modify and progress home exercise program. Skilled intervention required to improve ROM which will improve function. Reduce pain to improve function.    Plan:  Progress exercises as tolerated to improve overall UE strength and performance in daily activities , Exercises to gradually increase range of motion., Manual therapy to  muscle tightness and improve joint mobility. , and Modalities as needed to address  "symptoms.    Therapy diagnoses:   1. Laceration of left hand  Follow Up In Occupational Therapy      2. Greenstick fracture of distal radius, left, closed, initial encounter  Follow Up In Occupational Therapy      3. Sprain of left scapholunate ligament  Follow Up In Occupational Therapy              Subjective:  Michael reports he feels like his condition is improving.  He reports he notices more range of motion with update to home exercises last visit .  Progress towards functional goal:  Improve  and pinch strength, Improved performance in daily activities , and Reduce pain level  Pain Location L wrist  Pain (0-10): 3   HEP adherence / understanding: compliance with the instructed home exercises.    Precautions:  Fall Risk: None    Precautions listed: scapholunate lig reconstruction protocol      Objective: NP    Treatment Performed: (\"NP\" = Not Performed)     Therapeutic Exercise: NP    Therapeutic Activities: 10 min  -  2 min 2T  -  2 min 2T  -  2 min 3T  -  2 min 8T    Manual Therapy: 15 mins  - Joint mobilization and wrist stretching in all planes   - Retrograde massage to dorsal wrist    Neuromuscular Re-Education: NP  -   Modalities: 15 mins  - Fluidotherapy with wrist and digit mobility    Education: Reviewed home exercise program.   "

## 2024-07-12 ENCOUNTER — TREATMENT (OUTPATIENT)
Dept: OCCUPATIONAL THERAPY | Facility: CLINIC | Age: 64
End: 2024-07-12
Payer: COMMERCIAL

## 2024-07-12 DIAGNOSIS — S52.592A GREENSTICK FRACTURE OF DISTAL RADIUS, LEFT, CLOSED, INITIAL ENCOUNTER: ICD-10-CM

## 2024-07-12 DIAGNOSIS — S61.412A LACERATION OF LEFT HAND: ICD-10-CM

## 2024-07-12 DIAGNOSIS — S63.512A SPRAIN OF LEFT SCAPHOLUNATE LIGAMENT: ICD-10-CM

## 2024-07-12 PROCEDURE — 97140 MANUAL THERAPY 1/> REGIONS: CPT | Mod: GO | Performed by: OCCUPATIONAL THERAPIST

## 2024-07-12 PROCEDURE — 97530 THERAPEUTIC ACTIVITIES: CPT | Mod: GO | Performed by: OCCUPATIONAL THERAPIST

## 2024-07-12 PROCEDURE — 97022 WHIRLPOOL THERAPY: CPT | Mod: GO | Performed by: OCCUPATIONAL THERAPIST

## 2024-07-15 ENCOUNTER — APPOINTMENT (OUTPATIENT)
Dept: OCCUPATIONAL THERAPY | Facility: CLINIC | Age: 64
End: 2024-07-15
Payer: COMMERCIAL

## 2024-07-16 NOTE — PROGRESS NOTES
OCCUPATIONAL THERAPY TREATMENT NOTE    Patient Name:  Michael Padron   Patient MRN: [id number]  Date: 7/17/2024  Time Calculation  Start Time: 0915  Stop Time: 0955  Time Calculation (min): 40 min    Insurance:  Visit number: 8 of 16  Insurance Type: Payor: [employer] / Plan: [employer] / Product Type: *No Product type* /   Authorization or Plan of Care date Range:  6/12-8/12/24   Copay: n/a  Referred by: Levon Prescott MD   Approved diagnosis: S61.412A, S52.592, S63.512A        OT Therapeutic Procedures Time Entry  Manual Therapy Time Entry: 15  Therapeutic Activity Time Entry: 10  OT Modalities Time Entry  Whirlpool Time Entry: 15                  General:  Reason for visit: Traumatic rupture of L scapholunate ligament   Referred by: Levon Prescott MD     Type of surgery: Left Scapholunate Ligament Reconstruction With C-arm - Left  Date of surgery: 4/30/2024  Days since surgery: 78    Assessment:  Progress towards functional goals: Improved mobility in L wrist and Improved performance in daily activities   Response to interventions:  Functional ROM achieved during manual stretches, especially in forearm sup/pro. Patient continues to tolerate BTE program with no adverse reactions.  Patient notes soreness in L wrist after  trials.   , Improved tolerance to strengthening progressions., and Patient was appropriately fatigued with no complaints.  Justification for continued skilled care: To address remaining functional, objective and subjective deficits to allow them to return to full independence with ADLs. Modify and progress home exercise program. Skilled intervention required to improve ROM which will improve function. Reduce pain to improve function.    Plan:  Progress exercises as tolerated to improve overall UE strength and performance in daily activities , Exercises to gradually increase range of motion., Manual therapy  "to  muscle tightness and improve joint mobility. , and Modalities as needed to address symptoms.    Therapy diagnoses:   1. Laceration of left hand  Follow Up In Occupational Therapy      2. Greenstick fracture of distal radius, left, closed, initial encounter  Follow Up In Occupational Therapy      3. Sprain of left scapholunate ligament  Follow Up In Occupational Therapy          Subjective:  Michael reports he feels like his condition is improving.  Patient reports he has been using his L wrist in all daily activities. Notes soreness after these tasks. Reports he likes the new small wrist brace, as it is less restricting. Reports he was sore last session after completing BTE activities.   Progress towards functional goal:  Improve  and pinch strength, Improved performance in daily activities , and Reduce pain level  Pain Location L wrist  Pain (0-10): 2   HEP adherence / understanding: compliance with the instructed home exercises.    Precautions:  Fall Risk: None    Precautions listed: scapholunate lig reconstruction protocol      Objective: NP    Treatment Performed: (\"NP\" = Not Performed)     Therapeutic Exercise: NP    Therapeutic Activities: 10 min  -  2 min 2T  -  2 min 2T  -  2 min 3T  -   trials 30%, 40%, 50%; 90 secs each     Manual Therapy: 15 mins  - Joint mobilization, a&p carpal joint mobs and wrist stretching in all planes   - Retrograde massage to dorsal wrist    Neuromuscular Re-Education: NP  -   Modalities: 15 mins  - Fluidotherapy with wrist and digit mobility    Education: Reviewed home exercise program.   "

## 2024-07-17 ENCOUNTER — TREATMENT (OUTPATIENT)
Dept: OCCUPATIONAL THERAPY | Facility: CLINIC | Age: 64
End: 2024-07-17
Payer: COMMERCIAL

## 2024-07-17 ENCOUNTER — HOSPITAL ENCOUNTER (OUTPATIENT)
Dept: RADIOLOGY | Facility: CLINIC | Age: 64
Discharge: HOME | End: 2024-07-17
Payer: COMMERCIAL

## 2024-07-17 ENCOUNTER — APPOINTMENT (OUTPATIENT)
Dept: ORTHOPEDIC SURGERY | Facility: CLINIC | Age: 64
End: 2024-07-17
Payer: COMMERCIAL

## 2024-07-17 VITALS — BODY MASS INDEX: 27.49 KG/M2 | WEIGHT: 192 LBS | HEIGHT: 70 IN

## 2024-07-17 DIAGNOSIS — S61.412A LACERATION OF LEFT HAND: ICD-10-CM

## 2024-07-17 DIAGNOSIS — S52.592A GREENSTICK FRACTURE OF DISTAL RADIUS, LEFT, CLOSED, INITIAL ENCOUNTER: ICD-10-CM

## 2024-07-17 DIAGNOSIS — S63.392A TRAUMATIC RUPTURE OF LEFT SCAPHOLUNATE LIGAMENT: ICD-10-CM

## 2024-07-17 DIAGNOSIS — S63.392A TRAUMATIC RUPTURE OF LEFT SCAPHOLUNATE LIGAMENT: Primary | ICD-10-CM

## 2024-07-17 DIAGNOSIS — S63.512A SPRAIN OF LEFT SCAPHOLUNATE LIGAMENT: ICD-10-CM

## 2024-07-17 PROCEDURE — 97530 THERAPEUTIC ACTIVITIES: CPT | Mod: GO

## 2024-07-17 PROCEDURE — 73110 X-RAY EXAM OF WRIST: CPT | Mod: LT

## 2024-07-17 PROCEDURE — 97022 WHIRLPOOL THERAPY: CPT | Mod: GO

## 2024-07-17 PROCEDURE — 99024 POSTOP FOLLOW-UP VISIT: CPT | Performed by: ORTHOPAEDIC SURGERY

## 2024-07-17 PROCEDURE — 97140 MANUAL THERAPY 1/> REGIONS: CPT | Mod: GO

## 2024-07-17 PROCEDURE — 3008F BODY MASS INDEX DOCD: CPT | Performed by: ORTHOPAEDIC SURGERY

## 2024-07-17 ASSESSMENT — PAIN - FUNCTIONAL ASSESSMENT: PAIN_FUNCTIONAL_ASSESSMENT: 0-10

## 2024-07-17 ASSESSMENT — PAIN SCALES - GENERAL: PAINLEVEL_OUTOF10: 2

## 2024-07-17 NOTE — PROGRESS NOTES
Subjective    Patient ID: Michael Padron is a 64 y.o. male.    Chief Complaint: Worker's Compensation ([facility] CLAIM #: [id number]/DOI: 1/24/24/DX: S63.512A, S61.412A, S52.592A//F/U LEFT SCAPHOLUNATE LIGAMENT RECONSTRUCTION/DOS:4/30/24)     Last Surgery: Left Scapholunate Ligament Reconstruction With C-arm - Left  Last Surgery Date: 4/30/2024    HPI  Patient returns to clinic for follow-up of his left scapholunate ligament reconstruction.  He is approximately 10 weeks out from surgery.  He is progressing with therapy.  He has noticed improved range of motion with decreased pain.  He denies any numbness or paresthesias.  He did finish off the antibiotics that were prescribed from his pin tract infection.  Objective   Ortho Exam  Patient is in no acute distress.  Exam of his left wrist reveals his incision is well-healed.  His pin site has closed over without any swelling or erythema.  He is still mildly tender dorsally near the scapholunate interval.  He has about 100 degree arc of flexion and extension.    Image Results:  X-rays of his left wrist were personally reviewed today.  He has maintained adequate alignment at the scapholunate interval.  There is no evidence of any infection from the pin site.  There is no evidence of any fracture.    Assessment/Plan   Encounter Diagnoses:  Traumatic rupture of left scapholunate ligament    Orders Placed This Encounter    XR wrist left 3+ views     Patient is appropriately improving following his left scapholunate ligament reconstruction.  He will continue with therapy.  He will follow-up in approximately 3 weeks with x-rays of his left wrist.

## 2024-07-23 NOTE — PROGRESS NOTES
OCCUPATIONAL THERAPY TREATMENT NOTE    Patient Name:  Michael Padron   Patient MRN: [id number]  Date: 7/26/2024  Time Calculation  Start Time: 1030  Stop Time: 1115  Time Calculation (min): 45 min    Insurance:  Visit number: 9 of 16  Insurance Type: Payor: [employer]LUCA / Plan: [employer] / Product Type: *No Product type* /   Authorization or Plan of Care date Range:  6/12-8/12/24   Copay: n/a  Referred by: Levon Prescott MD   Approved diagnosis: S61.412A, S52.592, S63.512A        OT Therapeutic Procedures Time Entry  Manual Therapy Time Entry: 15  Therapeutic Activity Time Entry: 15  OT Modalities Time Entry  Whirlpool Time Entry: 15                  General:  Reason for visit: Traumatic rupture of L scapholunate ligament   Referred by: Levon Prescott MD     Type of surgery: Left Scapholunate Ligament Reconstruction With C-arm - Left  Date of surgery: 4/30/2024  Days since surgery: 87    Assessment:  Progress towards functional goals: Improved mobility in L wrist and Improved performance in daily activities   Response to interventions:  Objective measurements performed indicating increased L wrist AROM. ROM is within functional range. Increased activity tolerance noted as patient performed all BTE activities with increased resistance. Patient experienced no adverse reactions to BTE activities.   and Improved tolerance to strengthening progressions.  Justification for continued skilled care: To address remaining functional, objective and subjective deficits to allow them to return to full independence with ADLs. Modify and progress home exercise program. Skilled intervention required to improve ROM which will improve function. Reduce pain to improve function.    Plan:  Progress exercises as tolerated to improve overall UE strength and performance in daily activities , Exercises to gradually increase range of motion., Manual  "therapy to  muscle tightness and improve joint mobility. , and Modalities as needed to address symptoms.  Will request a C9 extension    Therapy diagnoses:   1. Laceration of left hand  Follow Up In Occupational Therapy      2. Greenstick fracture of distal radius, left, closed, initial encounter  Follow Up In Occupational Therapy      3. Sprain of left scapholunate ligament  Follow Up In Occupational Therapy            Subjective:  Michael reports he feels like his condition is improving.  Patient reports he had a follow up with Dr. Prescott on 24 who stated his wrist is healing appropriately. [place] has extended his return to work date to . Patient reports he continues to have limited AROM in L wrist but has been trying to perform normal day activities.   Progress towards functional goal:  Improve  and pinch strength, Improved performance in daily activities , and Reduce pain level  Pain Location L wrist  Pain (0-10): 2   HEP adherence / understanding: compliance with the instructed home exercises.    Precautions:  Fall Risk: None    Precautions listed: scapholunate lig reconstruction protocol      Objective:   Active range of motion:  L Wrist:  - Flexion: 55 (+25)  - Extension: 70 (+10)  - Rd dev: 15 (+5)  - Ul dev: 25 (+15)    Treatment Performed: (\"NP\" = Not Performed)     Therapeutic Exercise: NP    Therapeutic Activities: 15 min  -  2 min 3T  -  2 min 3T  -  2 min 4T  -   trials 30%, 40%, 50%; 90 secs each     Manual Therapy: 15 mins  - Joint mobilization wrist stretching in all planes   - Retrograde massage to dorsal wrist  - Re-assessed objective measurements    Neuromuscular Re-Education: NP  -   Modalities: 15 mins  - Fluidotherapy with wrist and digit mobility    Education: Reviewed home exercise program.   "

## 2024-07-26 ENCOUNTER — TREATMENT (OUTPATIENT)
Dept: OCCUPATIONAL THERAPY | Facility: CLINIC | Age: 64
End: 2024-07-26
Payer: COMMERCIAL

## 2024-07-26 DIAGNOSIS — S52.592A GREENSTICK FRACTURE OF DISTAL RADIUS, LEFT, CLOSED, INITIAL ENCOUNTER: ICD-10-CM

## 2024-07-26 DIAGNOSIS — S61.412A LACERATION OF LEFT HAND: ICD-10-CM

## 2024-07-26 DIAGNOSIS — S63.512A SPRAIN OF LEFT SCAPHOLUNATE LIGAMENT: ICD-10-CM

## 2024-07-26 PROCEDURE — 97022 WHIRLPOOL THERAPY: CPT | Mod: GO

## 2024-07-26 PROCEDURE — 97140 MANUAL THERAPY 1/> REGIONS: CPT | Mod: GO

## 2024-07-26 PROCEDURE — 97530 THERAPEUTIC ACTIVITIES: CPT | Mod: GO

## 2024-07-26 ASSESSMENT — PAIN SCALES - GENERAL: PAINLEVEL_OUTOF10: 2

## 2024-07-26 ASSESSMENT — PAIN - FUNCTIONAL ASSESSMENT: PAIN_FUNCTIONAL_ASSESSMENT: 0-10

## 2024-07-29 NOTE — PROGRESS NOTES
OCCUPATIONAL THERAPY TREATMENT NOTE    Patient Name:  Michael Padron   Patient MRN: [id number]  Date: 7/31/2024  Time Calculation  Start Time: 1040  Stop Time: 1125  Time Calculation (min): 45 min    Insurance:  Visit number: 10 of 16  Insurance Type: Payor: [employer] / Plan: [employer] / Product Type: *No Product type* /   Authorization or Plan of Care date Range:  6/12-8/12/24   Copay: n/a  Referred by: Levon Prescott MD   Approved diagnosis: S61.412A, S52.592, S63.512A        OT Therapeutic Procedures Time Entry  Manual Therapy Time Entry: 10  Therapeutic Activity Time Entry: 20  OT Modalities Time Entry  Whirlpool Time Entry: 15                  General:  Reason for visit: Traumatic rupture of L scapholunate ligament   Referred by: Levon Prescott MD     Type of surgery: Left Scapholunate Ligament Reconstruction With C-arm - Left  Date of surgery: 4/30/2024  Days since surgery: 92    Assessment:  Progress towards functional goals: Improved mobility in L wrist and Improved performance in daily activities   Response to interventions:  Normal AROM achieved in L wrist during manual therapy. Continues to tolerate BTE program without sharp wrist pain. Muscle soreness experienced in L hand, as patient requested to discontinue  trials at 50%. Will continue to progress as tolerated. , Improved L wrist mobility, and Improved tolerance to strengthening progressions.  Justification for continued skilled care: To address remaining functional, objective and subjective deficits to allow them to return to full independence with ADLs. Modify and progress home exercise program. Skilled intervention required to improve ROM which will improve function. Reduce pain to improve function.    Plan:  Progress exercises as tolerated to improve overall UE strength and performance in daily activities , Exercises to gradually increase range of  "motion., Manual therapy to  muscle tightness and improve joint mobility. , and Modalities as needed to address symptoms.    Therapy diagnoses:   1. Laceration of left hand  Follow Up In Occupational Therapy      2. Greenstick fracture of distal radius, left, closed, initial encounter  Follow Up In Occupational Therapy      3. Sprain of left scapholunate ligament  Follow Up In Occupational Therapy        Subjective:  Michael reports he feels like his condition is improving. Patient reports he occasionally experiences sharp pain during heavy lifting and physical activities. Notes this pain can increase to a 8/10, but does not last last. Is able to complete some hobbies without pain in L wrist.    Progress towards functional goal:  Improve  and pinch strength, Improved performance in daily activities , and Reduce pain level  Pain Location L wrist  Pain (0-10): 3   HEP adherence / understanding: compliance with the instructed home exercises.    Precautions:  Fall Risk: None    Precautions listed: scapholunate lig reconstruction protocol      Objective: 24    Treatment Performed: (\"NP\" = Not Performed)     Therapeutic Exercise: NP    Therapeutic Activities: 20 min  -  2 min 3T  -  2 min 3T  -  2 min 4T  -   trials 30%, 40%; 90 secs each   -  Pinch trials 30%, 40%; 90 secs each     Manual Therapy: 15 mins  - Joint mobilization wrist stretching in all planes   - Retrograde massage to dorsal wrist    Neuromuscular Re-Education: NP  -   Modalities: 15 mins  - Fluidotherapy with wrist and digit mobility    Education: Reviewed home exercise program.   "

## 2024-07-31 ENCOUNTER — TREATMENT (OUTPATIENT)
Dept: OCCUPATIONAL THERAPY | Facility: CLINIC | Age: 64
End: 2024-07-31
Payer: COMMERCIAL

## 2024-07-31 DIAGNOSIS — S61.412A LACERATION OF LEFT HAND: ICD-10-CM

## 2024-07-31 DIAGNOSIS — S52.592A GREENSTICK FRACTURE OF DISTAL RADIUS, LEFT, CLOSED, INITIAL ENCOUNTER: ICD-10-CM

## 2024-07-31 DIAGNOSIS — S63.512A SPRAIN OF LEFT SCAPHOLUNATE LIGAMENT: ICD-10-CM

## 2024-07-31 PROCEDURE — 97530 THERAPEUTIC ACTIVITIES: CPT | Mod: GO

## 2024-07-31 PROCEDURE — 97140 MANUAL THERAPY 1/> REGIONS: CPT | Mod: GO

## 2024-07-31 PROCEDURE — 97022 WHIRLPOOL THERAPY: CPT | Mod: GO

## 2024-07-31 ASSESSMENT — PAIN SCALES - GENERAL: PAINLEVEL_OUTOF10: 3

## 2024-07-31 ASSESSMENT — PAIN - FUNCTIONAL ASSESSMENT: PAIN_FUNCTIONAL_ASSESSMENT: 0-10

## 2024-07-31 NOTE — PROGRESS NOTES
OCCUPATIONAL THERAPY TREATMENT NOTE    Patient Name:  Michael Padron   Patient MRN: [id number]  Date: 8/2/2024  Time Calculation  Start Time: 1025  Stop Time: 1110  Time Calculation (min): 45 min    Insurance:  Visit number: 11 of 16  Insurance Type: Payor: GIANLUCA / Plan: [employer]IZATION / Product Type: *No Product type* /   Authorization or Plan of Care date Range:  6/12-8/12/24   Copay: n/a  Referred by: Levon Prescott MD   Approved diagnosis: S61.412A, S52.592, S63.512A        OT Therapeutic Procedures Time Entry  Manual Therapy Time Entry: 10  Therapeutic Activity Time Entry: 20  OT Modalities Time Entry  Whirlpool Time Entry: 15                  General:  Reason for visit: Traumatic rupture of L scapholunate ligament   Referred by: Levon Prescott MD     Type of surgery: Left Scapholunate Ligament Reconstruction With C-arm - Left  Date of surgery: 4/30/2024  Days since surgery: 94    Assessment:  Progress towards functional goals: Improved mobility in L wrist and Improved performance in daily activities   Response to interventions:  Mild discomfort with wrist rd/ul dev stretches however normal ROM present. Performed lifting and fine motor tasks to simulate work tasks. Patient tolerated 10# box lifts however notes soreness wrist after activity completion. Increased muscle endurance achieved during Valpar board, as patient completed activity with proper form, accuracy, and without rest breaks. Muscle fatigue and wrist pain experience during  trials, as patient discontinued 50% at 70 secs. Will continue to strengthen L wrist to improve activity tolerance for work tasks. Instructed patient to complete wrist AROM 3 ways with 3# and 5# for HEP exercises. Provided HEP handouts. Patient reports he completes these exercises with 1# but will increase weight.  , Improved L wrist mobility, and Improved tolerance to strengthening  "progressions.  Justification for continued skilled care: To address remaining functional, objective and subjective deficits to allow them to return to full independence with ADLs. Modify and progress home exercise program. Skilled intervention required to improve ROM which will improve function. Reduce pain to improve function.    Plan:  Progress exercises as tolerated to improve overall UE strength and performance in daily activities , Exercises to gradually increase range of motion., Manual therapy to  muscle tightness and improve joint mobility. , and Modalities as needed to address symptoms.  Scheduled follow up with Dr. Prescott on 24    Therapy diagnoses:   1. Laceration of left hand  Follow Up In Occupational Therapy      2. Greenstick fracture of distal radius, left, closed, initial encounter  Follow Up In Occupational Therapy      3. Sprain of left scapholunate ligament  Follow Up In Occupational Therapy          Subjective:  Michael reports he feels like his condition is improving. Patient reports no changes in L wrist. Patient reports he felt sore after last session and took Tylenol for the pain. Notes he also took pain medication after vacuuming his car yesterday, d/t the soreness. He been trying to use his L wrist as much as possible, as he thinks he will be returning to work by 24.  Has a follow up with Dr. Prescott on 24.   Progress towards functional goal:  Improve  and pinch strength, Improved performance in daily activities , and Reduce pain level  Pain Location L wrist  Pain (0-10): 1   HEP adherence / understanding: compliance with the instructed home exercises.    Precautions:  Fall Risk: None    Precautions listed: scapholunate lig reconstruction protocol      Objective: 24    Treatment Performed: (\"NP\" = Not Performed)     Therapeutic Exercise: NP    Therapeutic Activities: 20 min  - 10# box lift from trunk to shoulder level   - Valpar board        - Screw into " Panel 3       - Unscrew from Panel 3 and screw into Panel 1       - Unscrew from Panel 1 and screw into Panel 2       - Unscrew from Panel 2  -   trials 30%, 40% 50% (discharge 70 secs); 90 secs each   - Wrist flex/ext with 3# over bolster  - Wrist rd/ul dev with 3# over bolster  - Forearm sup/pro with 3# over bolster     Manual Therapy: 10 mins  - Joint mobilization wrist stretching in all planes   - Retrograde massage to dorsal wrist    Neuromuscular Re-Education: NP  -   Modalities: 15 mins  - Fluidotherapy with wrist and digit mobility    Education: Added wrist AROM with 3# exercise(s) to HEP program Access Code [email or web address]

## 2024-08-02 ENCOUNTER — TREATMENT (OUTPATIENT)
Dept: OCCUPATIONAL THERAPY | Facility: CLINIC | Age: 64
End: 2024-08-02
Payer: COMMERCIAL

## 2024-08-02 DIAGNOSIS — S52.592A GREENSTICK FRACTURE OF DISTAL RADIUS, LEFT, CLOSED, INITIAL ENCOUNTER: ICD-10-CM

## 2024-08-02 DIAGNOSIS — S61.412A LACERATION OF LEFT HAND: ICD-10-CM

## 2024-08-02 DIAGNOSIS — S63.512A SPRAIN OF LEFT SCAPHOLUNATE LIGAMENT: ICD-10-CM

## 2024-08-02 PROCEDURE — 97530 THERAPEUTIC ACTIVITIES: CPT | Mod: GO

## 2024-08-02 PROCEDURE — 97022 WHIRLPOOL THERAPY: CPT | Mod: GO

## 2024-08-02 PROCEDURE — 97140 MANUAL THERAPY 1/> REGIONS: CPT | Mod: GO

## 2024-08-02 ASSESSMENT — PAIN - FUNCTIONAL ASSESSMENT: PAIN_FUNCTIONAL_ASSESSMENT: 0-10

## 2024-08-02 ASSESSMENT — PAIN SCALES - GENERAL: PAINLEVEL_OUTOF10: 1

## 2024-08-07 ENCOUNTER — APPOINTMENT (OUTPATIENT)
Dept: OCCUPATIONAL THERAPY | Facility: CLINIC | Age: 64
End: 2024-08-07
Payer: COMMERCIAL

## 2024-08-07 ENCOUNTER — APPOINTMENT (OUTPATIENT)
Dept: ORTHOPEDIC SURGERY | Facility: CLINIC | Age: 64
End: 2024-08-07
Payer: COMMERCIAL

## 2024-08-09 ENCOUNTER — APPOINTMENT (OUTPATIENT)
Dept: OCCUPATIONAL THERAPY | Facility: CLINIC | Age: 64
End: 2024-08-09
Payer: COMMERCIAL

## 2024-08-09 NOTE — PROGRESS NOTES
OCCUPATIONAL THERAPY TREATMENT NOTE    Patient Name:  Michael Padron   Patient MRN: [id number]  Date: 8/12/2024  Time Calculation  Start Time: 1200  Stop Time: 1245  Time Calculation (min): 45 min    Insurance:  Visit number: 12 of 16  Insurance Type: Payor: GIANLUCA / Plan: [employer]IZATION / Product Type: *No Product type* /   Authorization or Plan of Care date Range:  6/12-8/12/24   Copay: n/a  Referred by: Levon Prescott MD   Approved diagnosis: S61.412A, S52.592, S63.512A        OT Therapeutic Procedures Time Entry  Therapeutic Activity Time Entry: 15  Therapeutic Exercise Time Entry: 15  OT Modalities Time Entry  Whirlpool Time Entry: 15                  General:  Reason for visit: Traumatic rupture of L scapholunate ligament   Referred by: Levon Prescott MD     Type of surgery: Left Scapholunate Ligament Reconstruction With C-arm - Left  Date of surgery: 4/30/2024  Days since surgery: 104    Assessment:  Progress towards functional goals: Improved mobility in L wrist and Improved performance in daily activities   Response to interventions:  Objective measurements were re-assessed indicating increased wrist AROM, /pinch strength, and fine motor skills. /pinch strength is functional for age range. Patient has achieved 4/6 goals since initial eval. Therapist will request C9 extension to continue strengthening L wrist/hand to prepare patient to return to work on 09/09. Patient tolerated increased resistance during BTE activities without pain in the L wrist. Required short rest breaks during  trials d/t muscle fatigue.  , Improved L wrist mobility, and Improved tolerance to strengthening progressions.  Justification for continued skilled care: To address remaining functional, objective and subjective deficits to allow them to return to full independence with ADLs. Modify and progress home exercise program. Skilled  intervention required to improve ROM which will improve function. Reduce pain to improve function.    Plan:  Progress exercises as tolerated to improve overall UE strength and performance in daily activities , Exercises to gradually increase range of motion., Manual therapy to  muscle tightness and improve joint mobility. , and Modalities as needed to address symptoms.  Requested C9 extension. Will schedule more visits once extension is approved.     Therapy diagnoses:   1. Laceration of left hand  Follow Up In Occupational Therapy      2. Greenstick fracture of distal radius, left, closed, initial encounter  Follow Up In Occupational Therapy      3. Sprain of left scapholunate ligament  Follow Up In Occupational Therapy            Subjective:  Michael reports he feels like his condition is not changing. 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 Notes he had no pain when he was completing the activity. Patient then experienced significant soreness in L thumb/wrist (6/10) yesterday, as he was sore from the  and from driving 5 hours. Reports he is returning to work on 24. Had a follow up with Dr. Prescott who stated his is demonstrating satisfactory improvements and to use LUE as much as tolerated.   Progress towards functional goal:  Improve  and pinch strength, Improved performance in daily activities , and Reduce pain level  Pain Location L wrist  Pain (0-10): 3   HEP adherence / understanding: compliance with the instructed home exercises.    Precautions:  Fall Risk: None    Precautions listed: scapholunate lig reconstruction protocol      Objective:   Active range of motion:  L Elbow: WNL     L Wrist:  - Flexion: 45 (+15)  - Extension: 70 (+10)  - Rd dev: 15 (+5)  - Ul dev: 30 (+20)      strength:  - RUE : 56# (no change)  - LUE : 40# (+16#)     Pinch Strength:  - Lateral:       -  "RUE: 13.5# (+3.5#)       - LUE: 7# (+3#)  - 3 Jaw:       - RUE: 9# (+2#)       - LUE: 8# (+4#)     Outcome Measures:  OT Adult Other Outcome Measures:   9 Hole Peg Test: R: 35.8 secs (1.2 secs improvement)   L: 32.6 secs (9.3 secs improvement)  OT Adult Other Outcome Measures  Other Outcome Measures: 32 (11 point improvement) (QuickDASH)  (47.73%)        Goals:  Patient will present with a pain score of 1/10 in L wrist. Progressing      Patient to increase L wrist flexion AROM by 15 degrees in order to improve independent performance in daily activities. Met     Patient will improve LUE  strength by 10lb to improve performance in lifting and grasping tasks. Met     Patient to improve L hand coordination on 9 hole peg test to <40 secs for dexterity tasks such as writing, buttoning, and zipping. Met     Patient to improve QuickDASH score to at or below 30% to increase independency in ADLs and IADLs. Progressing     Patient will report understanding of home program, demonstrate independence and verbalize precautions. Met    Treatment Performed: (\"NP\" = Not Performed)     Therapeutic Exercise: 15 mins  - Re-assessed objective measurements  - Discussed goal achievement and POC   - Reviewed HEP exercises     Therapeutic Activities: 15 mins  -  wrist flex/ext 5.3T; 120 secs  - BTE 302wrist rd/ul dev 3.0T; 120 secs   -   trials 30%, 40%  90 secs each     Manual Therapy: NP    Neuromuscular Re-Education: NP  -   Modalities: 15 mins  - Fluidotherapy with wrist and digit mobility    Education: Reviewed home exercise program.   "

## 2024-08-12 ENCOUNTER — APPOINTMENT (OUTPATIENT)
Dept: OCCUPATIONAL THERAPY | Facility: CLINIC | Age: 64
End: 2024-08-12
Payer: COMMERCIAL

## 2024-08-12 ENCOUNTER — APPOINTMENT (OUTPATIENT)
Dept: ORTHOPEDIC SURGERY | Facility: CLINIC | Age: 64
End: 2024-08-12
Payer: COMMERCIAL

## 2024-08-12 ENCOUNTER — HOSPITAL ENCOUNTER (OUTPATIENT)
Dept: RADIOLOGY | Facility: CLINIC | Age: 64
Discharge: HOME | End: 2024-08-12
Payer: COMMERCIAL

## 2024-08-12 DIAGNOSIS — S61.412A LACERATION OF LEFT HAND: ICD-10-CM

## 2024-08-12 DIAGNOSIS — S63.392A TRAUMATIC RUPTURE OF LEFT SCAPHOLUNATE LIGAMENT: Primary | ICD-10-CM

## 2024-08-12 DIAGNOSIS — S63.392A TRAUMATIC RUPTURE OF LEFT SCAPHOLUNATE LIGAMENT: ICD-10-CM

## 2024-08-12 DIAGNOSIS — S52.592A GREENSTICK FRACTURE OF DISTAL RADIUS, LEFT, CLOSED, INITIAL ENCOUNTER: ICD-10-CM

## 2024-08-12 DIAGNOSIS — S63.512A SPRAIN OF LEFT SCAPHOLUNATE LIGAMENT: ICD-10-CM

## 2024-08-12 PROCEDURE — 73110 X-RAY EXAM OF WRIST: CPT | Mod: LT

## 2024-08-12 PROCEDURE — 97022 WHIRLPOOL THERAPY: CPT | Mod: GO

## 2024-08-12 PROCEDURE — 99213 OFFICE O/P EST LOW 20 MIN: CPT | Performed by: ORTHOPAEDIC SURGERY

## 2024-08-12 PROCEDURE — 97530 THERAPEUTIC ACTIVITIES: CPT | Mod: GO

## 2024-08-12 PROCEDURE — 97110 THERAPEUTIC EXERCISES: CPT | Mod: GO

## 2024-08-12 ASSESSMENT — PAIN - FUNCTIONAL ASSESSMENT: PAIN_FUNCTIONAL_ASSESSMENT: 0-10

## 2024-08-12 ASSESSMENT — PAIN SCALES - GENERAL: PAINLEVEL_OUTOF10: 3

## 2024-08-12 NOTE — PROGRESS NOTES
Subjective    Patient ID: Michael Padron is a 64 y.o. male.    Chief Complaint: OTHER (Worker's Compensation ([facility] CLAIM #: [id number]/DOI: 1/24/24/DX: S63.512A, S61.412A, S52.592A//F/U LEFT SCAPHOLUNATE LIGAMENT RECONSTRUCTION/DOS:4/30/24))     Last Surgery: Left Scapholunate Ligament Reconstruction With C-arm - Left  Last Surgery Date: 4/30/2024    HPI  Patient comes in for follow-up of his left scapholunate ligament reconstruction.  He is about 14 weeks out from surgery.  He has progressed with therapy.  He still notices weakness however.    Objective   Ortho Exam  Patient is in no acute distress.  Exam of his left wrist reveals his incision is well-healed.  There is no evidence of infection.  He has full supination and pronation.  He has greater than 100 degree arc of flexion and extension in his wrist.    Image Results:  X-rays of his left wrist were personally reviewed today.  They show adequate alignment at the scapholunate interval.  There is no fracture or dislocation noted.    Assessment/Plan   Encounter Diagnoses:  Traumatic rupture of left scapholunate ligament    Orders Placed This Encounter    XR wrist left 3+ views     Patient is progressing satisfactory following his left scapholunate ligament reconstruction.  He is in therapy for strengthening.  However he will likely progress to a home program.  He may begin using his left hand is much as he can tolerate.  He is allowed to return to work on September 7, 2024 without restrictions.  He will follow-up as his symptoms dictate or as required by Worker's Compensation.

## 2024-08-12 NOTE — LETTER
August 12, 2024     Patient: Michael Padron   YOB: 1960   Date of Visit: 8/12/2024       To Whom It May Concern:    It is my medical opinion that Mihcael Padron may return to work on 7/2024 with no restrictions .    If you have any questions or concerns, please don't hesitate to call.         Sincerely,        Levon Prescott MD    CC: No Recipients

## 2024-08-28 ENCOUNTER — TREATMENT (OUTPATIENT)
Dept: OCCUPATIONAL THERAPY | Facility: CLINIC | Age: 64
End: 2024-08-28
Payer: COMMERCIAL

## 2024-08-28 DIAGNOSIS — S61.412A LACERATION OF LEFT HAND: ICD-10-CM

## 2024-08-28 DIAGNOSIS — S52.592A GREENSTICK FRACTURE OF DISTAL RADIUS, LEFT, CLOSED, INITIAL ENCOUNTER: ICD-10-CM

## 2024-08-28 DIAGNOSIS — S63.512A SPRAIN OF LEFT SCAPHOLUNATE LIGAMENT: ICD-10-CM

## 2024-08-28 PROCEDURE — 97530 THERAPEUTIC ACTIVITIES: CPT | Mod: GO

## 2024-08-28 PROCEDURE — 97022 WHIRLPOOL THERAPY: CPT | Mod: GO

## 2024-08-28 ASSESSMENT — PAIN - FUNCTIONAL ASSESSMENT: PAIN_FUNCTIONAL_ASSESSMENT: 0-10

## 2024-08-28 ASSESSMENT — PAIN SCALES - GENERAL: PAINLEVEL_OUTOF10: 1

## 2024-08-28 NOTE — PROGRESS NOTES
OCCUPATIONAL THERAPY TREATMENT NOTE    Patient Name:  Michael Padron   Patient MRN: [id number]  Date: 8/28/2024  Time Calculation  Start Time: 0745  Stop Time: 0830  Time Calculation (min): 45 min    Insurance:  Visit number: 13 of 16  Insurance Type: Payor: [employer] / Plan: [employer] / Product Type: *No Product type* /   Authorization or Plan of Care date Range:  6/12-8/12/24 Extended to 09/09/24  Copay: n/a  Referred by: Levon Prescott MD   Approved diagnosis: S61.412A, S52.592, S63.512A        OT Therapeutic Procedures Time Entry  Therapeutic Activity Time Entry: 30  OT Modalities Time Entry  Whirlpool Time Entry: 15                  General:  Reason for visit: Traumatic rupture of L scapholunate ligament   Referred by: Levon Prescott MD     Type of surgery: Left Scapholunate Ligament Reconstruction With C-arm - Left  Date of surgery: 4/30/2024  Days since surgery: 120    Assessment:  Progress towards functional goals: Improved mobility in L wrist and Improved performance in daily activities   Response to interventions:  Patient performed all strengthening and fine motor activities with proper form and at an appropriate speed. Chronic tremors affected performance of O'Vasiliy Tweezers Pegboard, resulting is discontinuation of the activity. Increased activity tolerance noted as patient performed , 302 with increased resistance and no pain. , Improved tolerance to strengthening progressions., and Demonstrated increased fine motor control during therapeutic activities.   Justification for continued skilled care: To address remaining functional, objective and subjective deficits to allow them to return to full independence with ADLs. Modify and progress home exercise program. Skilled intervention required to improve ROM which will improve function. Reduce pain to improve function.    Plan:  Progress exercises as  "tolerated to improve overall UE strength and performance in daily activities , Exercises to gradually increase range of motion., Manual therapy to  muscle tightness and improve joint mobility. , and Modalities as needed to address symptoms.  C9 extended to 24. 2x for 2 weeks     Therapy diagnoses:   1. Laceration of left hand  Follow Up In Occupational Therapy      2. Greenstick fracture of distal radius, left, closed, initial encounter  Follow Up In Occupational Therapy      3. Sprain of left scapholunate ligament  Follow Up In Occupational Therapy          Subjective:  Michael reports he feels like his condition is improving. Patient reports he has been using his L wrist normally is all activities. Patient has been adapting to limited ROM. Patient to return to work on 24.  Progress towards functional goal:  Improve  and pinch strength, Improved performance in daily activities , and Reduce pain level  Pain Location L wrist  Pain (0-10): 1   HEP adherence / understanding: compliance with the instructed home exercises.    Precautions:  Fall Risk: None    Precautions listed: scapholunate lig reconstruction protocol      Objective: 24, 24    Treatment Performed: (\"NP\" = Not Performed)     Therapeutic Exercise: NP    Therapeutic Activities: 30 mins  -  wrist flex/ext 6.3T; 120 secs  -  wrist rd/ul dev 4.0T; 120 secs   -  forearm sup/pro 4T; 120 secs   -   trials 30%, 40%  90 secs each   - Pinch, place, and release pegs in Groove pegboard   - Valpar board        - Screw into panel 3       - Screw from Panel 3 to panel 1        - Screw from panel 1 to panel 2       - Unscrew from panel 2     Manual Therapy: NP    Neuromuscular Re-Education: NP  -   Modalities: 15 mins  - Fluidotherapy with wrist and digit mobility    Education: Reviewed home exercise program.   "

## 2024-08-29 NOTE — PROGRESS NOTES
OCCUPATIONAL THERAPY TREATMENT NOTE    Patient Name:  Michael Padron   Patient MRN: [id number]  Date: 2024  Time Calculation  Start Time: 0945  Stop Time: 1015  Time Calculation (min): 30 min    Insurance:  Visit number: 14 of 16  Insurance Type: Payor: [employer] / Plan: [employer] / Product Type: *No Product type* /   Authorization or Plan of Care date Range:  -24 Extended to 24  Copay: n/a  Referred by: Levon Prescott MD   Approved diagnosis: S61.412A, S52.592, S63.512A        OT Therapeutic Procedures Time Entry  Therapeutic Activity Time Entry: 20  OT Modalities Time Entry  Whirlpool Time Entry: 10                  General:  Reason for visit: Traumatic rupture of L scapholunate ligament   Referred by: Levon Prescott MD     Type of surgery: Left Scapholunate Ligament Reconstruction With C-arm - Left  Date of surgery: 2024  Days since surgery: 122    Assessment:  Progress towards functional goals: Improved mobility in L wrist and Improved performance in daily activities   Response to interventions:  Increased activity tolerance noted as patient completed BTE activities 504 and 601 with increased tolerance. Tolerated BTE program with no adverse reactions.  and Tolerated increased resistance during , 601  Justification for continued skilled care: To address remaining functional, objective and subjective deficits to allow them to return to full independence with ADLs. Modify and progress home exercise program. Skilled intervention required to improve ROM which will improve function. Reduce pain to improve function.    Plan:  Progress exercises as tolerated to improve overall UE strength and performance in daily activities , Exercises to gradually increase range of motion., Manual therapy to  muscle tightness and improve joint mobility. , and Modalities as needed to address symptoms.  C9  "extended to 09/09/24. 2x for 2 weeks     Therapy diagnoses:   1. Laceration of left hand  Follow Up In Occupational Therapy      2. Greenstick fracture of distal radius, left, closed, initial encounter  Follow Up In Occupational Therapy      3. Sprain of left scapholunate ligament  Follow Up In Occupational Therapy            Subjective:  Michael reports he feels like his condition is improving. Patient notes no significant changes in L wrist.   Progress towards functional goal:  Improve  and pinch strength, Improved performance in daily activities , and Reduce pain level  Pain Location L wrist  Pain (0-10): 2   HEP adherence / understanding: compliance with the instructed home exercises.    Precautions:  Fall Risk: None    Precautions listed: scapholunate lig reconstruction protocol      Objective: 06/18/24, 08/12/24    Treatment Performed: (\"NP\" = Not Performed)     Therapeutic Exercise: NP    Therapeutic Activities: 20 mins  -  wrist flex/ext 7.3T; 120 secs  -  wrist rd/ul dev 4.0T; 120 secs   -  forearm sup/pro 5T; 120 secs   -   trials 30%, 40%  90 secs each   -  Pinch Trials 30%, 40%; 90 secs each     Manual Therapy: NP    Neuromuscular Re-Education: NP  -   Modalities: 10 mins  - Fluidotherapy with wrist and digit mobility    Education: Reviewed home exercise program.   "

## 2024-08-30 ENCOUNTER — TREATMENT (OUTPATIENT)
Dept: OCCUPATIONAL THERAPY | Facility: CLINIC | Age: 64
End: 2024-08-30
Payer: COMMERCIAL

## 2024-08-30 DIAGNOSIS — S61.412A LACERATION OF LEFT HAND: ICD-10-CM

## 2024-08-30 DIAGNOSIS — S52.592A GREENSTICK FRACTURE OF DISTAL RADIUS, LEFT, CLOSED, INITIAL ENCOUNTER: ICD-10-CM

## 2024-08-30 DIAGNOSIS — S63.512A SPRAIN OF LEFT SCAPHOLUNATE LIGAMENT: ICD-10-CM

## 2024-08-30 PROCEDURE — 97530 THERAPEUTIC ACTIVITIES: CPT | Mod: GO

## 2024-08-30 PROCEDURE — 97022 WHIRLPOOL THERAPY: CPT | Mod: GO

## 2024-08-30 ASSESSMENT — PAIN SCALES - GENERAL: PAINLEVEL_OUTOF10: 2

## 2024-08-30 ASSESSMENT — PAIN - FUNCTIONAL ASSESSMENT: PAIN_FUNCTIONAL_ASSESSMENT: 0-10

## 2024-09-03 ENCOUNTER — TREATMENT (OUTPATIENT)
Dept: OCCUPATIONAL THERAPY | Facility: CLINIC | Age: 64
End: 2024-09-03
Payer: COMMERCIAL

## 2024-09-03 DIAGNOSIS — S61.412A LACERATION OF LEFT HAND: ICD-10-CM

## 2024-09-03 DIAGNOSIS — S52.592A GREENSTICK FRACTURE OF DISTAL RADIUS, LEFT, CLOSED, INITIAL ENCOUNTER: ICD-10-CM

## 2024-09-03 DIAGNOSIS — S63.512A SPRAIN OF LEFT SCAPHOLUNATE LIGAMENT: ICD-10-CM

## 2024-09-03 PROCEDURE — 97022 WHIRLPOOL THERAPY: CPT | Mod: GO

## 2024-09-03 PROCEDURE — 97530 THERAPEUTIC ACTIVITIES: CPT | Mod: GO

## 2024-09-03 PROCEDURE — 97140 MANUAL THERAPY 1/> REGIONS: CPT | Mod: GO

## 2024-09-03 ASSESSMENT — PAIN - FUNCTIONAL ASSESSMENT: PAIN_FUNCTIONAL_ASSESSMENT: 0-10

## 2024-09-03 ASSESSMENT — PAIN SCALES - GENERAL: PAINLEVEL_OUTOF10: 1

## 2024-09-03 NOTE — PROGRESS NOTES
OCCUPATIONAL THERAPY TREATMENT NOTE    Patient Name:  Michael Padron   Patient MRN: [id number]  Date: 9/3/2024  Time Calculation  Start Time: 0800  Stop Time: 0840  Time Calculation (min): 40 min    Insurance:  Visit number: 15 of 16  Insurance Type: Payor: [employer] / Plan: [employer] / Product Type: *No Product type* /   Authorization or Plan of Care date Range:  -24 Extended to 24  Copay: n/a  Referred by: Levon Prescott MD   Approved diagnosis: S61.412A, S52.592, S63.512A        OT Therapeutic Procedures Time Entry  Manual Therapy Time Entry: 10  Therapeutic Activity Time Entry: 15  OT Modalities Time Entry  Whirlpool Time Entry: 15                  General:  Reason for visit: Traumatic rupture of L scapholunate ligament   Referred by: Levon Prescott MD     Type of surgery: Left Scapholunate Ligament Reconstruction With C-arm - Left  Date of surgery: 2024  Days since surgery: 126    Assessment:  Progress towards functional goals: Improved mobility in L wrist and Improved performance in daily activities   Response to interventions:  Wrist PROM is WNL. Patient tolerated BTE program progression well. Performed  and 302 with increased resistance. Soreness noted in L wrist after BTE activities.  Will re-assess objective measurements NV.  Justification for continued skilled care: To address remaining functional, objective and subjective deficits to allow them to return to full independence with ADLs. Modify and progress home exercise program. Skilled intervention required to improve ROM which will improve function. Reduce pain to improve function.    Plan:  Progress exercises as tolerated to improve overall UE strength and performance in daily activities , Exercises to gradually increase range of motion., Manual therapy to  muscle tightness and improve joint mobility. , and Modalities as needed  "to address symptoms.  C9 extended to 09/09/24. 2x for 2 weeks   Will re-assess objective measurements NV    Therapy diagnoses:   1. Laceration of left hand  Follow Up In Occupational Therapy      2. Greenstick fracture of distal radius, left, closed, initial encounter  Follow Up In Occupational Therapy      3. Sprain of left scapholunate ligament  Follow Up In Occupational Therapy        Subjective:  Michael reports he feels like his condition is improving. Patient reports he continues to have some soreness in L wrist but he is able to manage the discomfort in his daily life.   Progress towards functional goal:  Improve  and pinch strength, Improved performance in daily activities , and Reduce pain level  Pain Location L wrist  Pain (0-10): 1   HEP adherence / understanding: compliance with the instructed home exercises.    Precautions:  Fall Risk: None    Precautions listed: scapholunate lig reconstruction protocol      Objective: 06/18/24, 08/12/24    Treatment Performed: (\"NP\" = Not Performed)     Therapeutic Exercise: NP    Therapeutic Activities: 15 mins  -  wrist flex/ext 8.3T; 120 secs  -  wrist rd/ul dev 5.0T; 120 secs   -  forearm sup/pro 5T; 120 secs   -   trials 30%, 40%  90 secs each   -  Pinch Trials 30%, 40%; 90 secs each     Manual Therapy: 10 mins  - Traction and stretching to L wrist in all planes    Neuromuscular Re-Education: NP  -   Modalities: 15 mins  - Fluidotherapy with wrist and digit mobility    Education: Reviewed home exercise program.   "

## 2024-09-04 NOTE — PROGRESS NOTES
OCCUPATIONAL THERAPY TREATMENT NOTE    Patient Name:  Michael Padron   Patient MRN: [id number]  Date: 9/5/2024  Time Calculation  Start Time: 0800  Stop Time: 0815  Time Calculation (min): 15 min    Insurance:  Visit number: 16 of 16  Insurance Type: Payor: [employer] / Plan: [employer]ATION / Product Type: *No Product type* /   Authorization or Plan of Care date Range:  6/12-8/12/24 Extended to 09/09/24  Copay: n/a  Referred by: Levon Prescott MD   Approved diagnosis: S61.412A, S52.592, S63.512A        OT Therapeutic Procedures Time Entry  Therapeutic Exercise Time Entry: 15                   **Patient requested to leave after objective measurements were performed**    General:  Reason for visit: Traumatic rupture of L scapholunate ligament   Referred by: Levon Prescott MD     Type of surgery: Left Scapholunate Ligament Reconstruction With C-arm - Left  Date of surgery: 4/30/2024  Days since surgery: 128    Assessment:  Progress towards functional goals: Improved mobility in L wrist and Improved performance in daily activities   Response to interventions:  Objective measurements re-assessed indicating increased L wrist AROM and /pinch strength. AROM and /pinch strength is within functional limits for patient's age. QuickDASH score improvements indicate increased participation in ADLs and IADLs. Patient has achieved 6/6 goals since initial eval. D/t functional improvements in L wrist and decreased pain, patient will be discharged from OT services at this time. Therapist reviewed HEP exercises with patient. Patient agreed with discharge and states he is ready to return to work on 09/09.   Justification for continued skilled care: To address remaining functional, objective and subjective deficits to allow them to return to full independence with ADLs. Assess effectiveness of HEP. Progressive strengthening to stabilize the L  wrist to improve function.    Plan:  Discharge from occupational therapy  Patient to return to work on 09/09/24    Therapy diagnoses:   1. Laceration of left hand  Follow Up In Occupational Therapy      2. Greenstick fracture of distal radius, left, closed, initial encounter  Follow Up In Occupational Therapy      3. Sprain of left scapholunate ligament  Follow Up In Occupational Therapy          Subjective:  Michael reports he feels like his condition is improving. Patient reports he is ready to go back to work on 09/09. Patient states his wrist has not been bothering him.   Progress towards functional goal:  Improve  and pinch strength, Improved performance in daily activities , and Reduce pain level  Pain Location L wrist  Pain (0-10): 1   HEP adherence / understanding: compliance with the instructed home exercises.    Precautions:  Fall Risk: None    Precautions listed: scapholunate lig reconstruction protocol      Objective: 06/18/24, 08/12/24, 09/04/24  Active range of motion:  L Elbow: WNL     L Wrist:  - Flexion: 65 (+20)  - Extension: 80 (+10)  - Rd dev: 15 (no change)  - Ul dev: 30 (no change)      strength:  - RUE : 60# (+10#)  - LUE : 50# (+10#)     Pinch Strength:  - Lateral:       - RUE: 10.5# (-3.5#)       - LUE: 6.5# (-.5#)  - 3 Jaw:       - RUE: 10# (+1#)       - LUE: 10# (+2#)     Outcome Measures:  OT Adult Other Outcome Measures:   9 Hole Peg Test: R: 38.4 (2.6 secs decline)   L: 38.9 secs (6.3 secs decline)  OT Adult Other Outcome Measures  Other Outcome Measures: 15 (17 point improvement) (QuickDASH)  (9.09%)        Goals:  Patient will present with a pain score of 1/10 in L wrist. Met     Patient to increase L wrist flexion AROM by 15 degrees in order to improve independent performance in daily activities. Met     Patient will improve LUE  strength by 10lb to improve performance in lifting and grasping tasks. Met     Patient to improve L hand coordination on 9 hole peg test  "to <40 secs for dexterity tasks such as writing, buttoning, and zipping. Met     Patient to improve QuickDASH score to at or below 30% to increase independency in ADLs and IADLs. Met     Patient will report understanding of home program, demonstrate independence and verbalize precautions. Met    Treatment Performed: (\"NP\" = Not Performed)     Therapeutic Exercise: 15 mins  - Re-assessed objective measurements  - Discussed goal achievement and discharge   - Reviewed HEP exercises     Therapeutic Activities: NP    Manual Therapy: NP    Neuromuscular Re-Education: NP  -   Modalities: NP    Education: Reviewed home exercise program.   "

## 2024-09-05 ENCOUNTER — TREATMENT (OUTPATIENT)
Dept: OCCUPATIONAL THERAPY | Facility: CLINIC | Age: 64
End: 2024-09-05
Payer: COMMERCIAL

## 2024-09-05 DIAGNOSIS — S63.512A SPRAIN OF LEFT SCAPHOLUNATE LIGAMENT: ICD-10-CM

## 2024-09-05 DIAGNOSIS — S61.412A LACERATION OF LEFT HAND: ICD-10-CM

## 2024-09-05 DIAGNOSIS — S52.592A GREENSTICK FRACTURE OF DISTAL RADIUS, LEFT, CLOSED, INITIAL ENCOUNTER: ICD-10-CM

## 2024-09-05 PROCEDURE — 97110 THERAPEUTIC EXERCISES: CPT | Mod: GO

## 2024-09-05 ASSESSMENT — PAIN - FUNCTIONAL ASSESSMENT: PAIN_FUNCTIONAL_ASSESSMENT: 0-10

## 2024-09-05 ASSESSMENT — PAIN SCALES - GENERAL: PAINLEVEL_OUTOF10: 1

## 2024-12-12 ENCOUNTER — CLINICAL SUPPORT (OUTPATIENT)
Dept: URGENT CARE | Age: 64
End: 2024-12-12
Payer: COMMERCIAL

## 2024-12-12 ENCOUNTER — ANCILLARY PROCEDURE (OUTPATIENT)
Dept: URGENT CARE | Age: 64
End: 2024-12-12
Payer: COMMERCIAL

## 2024-12-12 VITALS
WEIGHT: 171 LBS | RESPIRATION RATE: 18 BRPM | DIASTOLIC BLOOD PRESSURE: 74 MMHG | HEART RATE: 72 BPM | TEMPERATURE: 98.2 F | OXYGEN SATURATION: 96 % | SYSTOLIC BLOOD PRESSURE: 126 MMHG | HEIGHT: 71 IN | BODY MASS INDEX: 23.94 KG/M2

## 2024-12-12 DIAGNOSIS — S69.92XA HAND INJURY, LEFT, INITIAL ENCOUNTER: ICD-10-CM

## 2024-12-12 DIAGNOSIS — S69.92XA INJURY OF LEFT WRIST, INITIAL ENCOUNTER: ICD-10-CM

## 2024-12-12 DIAGNOSIS — S59.902A INJURY OF LEFT ELBOW, INITIAL ENCOUNTER: ICD-10-CM

## 2024-12-12 DIAGNOSIS — S69.92XA INJURY OF LEFT WRIST, INITIAL ENCOUNTER: Primary | ICD-10-CM
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
## 2025-01-08 ENCOUNTER — APPOINTMENT (OUTPATIENT)
Dept: ORTHOPEDIC SURGERY | Facility: CLINIC | Age: 65
End: 2025-01-08
Payer: COMMERCIAL

## 2025-01-08 DIAGNOSIS — S63.392A TRAUMATIC RUPTURE OF LEFT SCAPHOLUNATE LIGAMENT: Primary | ICD-10-CM

## (undated) DEVICE — LOOP, VESSEL, MINI, BLUE STERILE

## (undated) DEVICE — DRESSING, GAUZE, SUPER, KERLIX, 7.75 X 8.75 IN, BULK, NS

## (undated) DEVICE — BAG, BANDED, 36IN X 28IN

## (undated) DEVICE — CUFF, TOURNIQUET, DUAL PORT, SNG BLADDER, 18 IN, PLC

## (undated) DEVICE — KWIRE, BUSA, .045 X 4IN, NO 2, STERILE

## (undated) DEVICE — FORCEP, BIOPOLAR, ADSON, NON INSUL, 5IN 1.0MM

## (undated) DEVICE — BANDAGE, ELASTIC, PREMIUM, SELF-CLOSE, 4 IN X 5.5 YD, STERILE

## (undated) DEVICE — STOCKINETTE, TUBULAR, DBL PLY, PRECUT, 4 X 48 IN, STERILE

## (undated) DEVICE — Device

## (undated) DEVICE — DRESSING, GAUZE, PETROLATUM, PATCH, XEROFORM, 1 X 8 IN, STERILE

## (undated) DEVICE — GOWN, SURGICAL, IMPLT, BACK, XLARGE, XLONG, STERILE

## (undated) DEVICE — SLEEVE, VASO PRESS, CALF GARMENT, MEDIUM, GREEN

## (undated) DEVICE — BANDAGE, GAUZE, CONFORMING, KERLIX, 6 PLY, 4.5 IN X 4.1 YD

## (undated) DEVICE — BANDAGE, ELASTIC, PREMIUM, SELF-CLOSE, 3 IN X 5 YD, STERILE

## (undated) DEVICE — DX SWIVELOCK SL, W/ FORKED EYELET, 3.5 X 8.5MM

## (undated) DEVICE — NEEDLE, HYPODERMIC, SAFETY, GLIDE, 18G X 1.5"

## (undated) DEVICE — BASIN, EMESIS, STANDARD, STERILE

## (undated) DEVICE — PADDING, CAST, COTTON, 3 IN X 4 YD

## (undated) DEVICE — BANDAGE, STRETCH, CONFORM, 2 IN X 4.1 YD, STERILE

## (undated) DEVICE — STRIP, SKIN CLOSURE, STERI-STRIP, REINFORCED, 0.25 X 3 IN